# Patient Record
Sex: MALE | Race: WHITE | Employment: FULL TIME | URBAN - METROPOLITAN AREA
[De-identification: names, ages, dates, MRNs, and addresses within clinical notes are randomized per-mention and may not be internally consistent; named-entity substitution may affect disease eponyms.]

---

## 2017-02-02 ENCOUNTER — OFFICE VISIT (OUTPATIENT)
Dept: FAMILY MEDICINE CLINIC | Age: 37
End: 2017-02-02

## 2017-02-02 VITALS
WEIGHT: 169 LBS | DIASTOLIC BLOOD PRESSURE: 78 MMHG | BODY MASS INDEX: 29.95 KG/M2 | SYSTOLIC BLOOD PRESSURE: 118 MMHG | TEMPERATURE: 98.5 F | HEIGHT: 63 IN

## 2017-02-02 DIAGNOSIS — F41.9 ANXIETY: ICD-10-CM

## 2017-02-02 DIAGNOSIS — F32.4 MAJOR DEPRESSION SINGLE EPISODE, IN PARTIAL REMISSION (HCC): Primary | ICD-10-CM

## 2017-02-02 PROCEDURE — 99213 OFFICE O/P EST LOW 20 MIN: CPT | Performed by: FAMILY MEDICINE

## 2017-02-02 RX ORDER — PROMETHAZINE HYDROCHLORIDE 25 MG/1
25 TABLET ORAL EVERY 6 HOURS PRN
Qty: 20 TABLET | Refills: 0 | Status: SHIPPED | OUTPATIENT
Start: 2017-02-02 | End: 2017-02-07

## 2017-02-02 RX ORDER — DIPHENOXYLATE HYDROCHLORIDE AND ATROPINE SULFATE 2.5; .025 MG/1; MG/1
1 TABLET ORAL 4 TIMES DAILY PRN
Qty: 20 TABLET | Refills: 0 | Status: SHIPPED | OUTPATIENT
Start: 2017-02-02 | End: 2017-02-07

## 2017-02-02 ASSESSMENT — ENCOUNTER SYMPTOMS
BLOOD IN STOOL: 0
CONSTIPATION: 0
DIARRHEA: 1
VOMITING: 1
ABDOMINAL PAIN: 1
NAUSEA: 1

## 2017-02-14 RX ORDER — ALPRAZOLAM 1 MG/1
TABLET ORAL
Qty: 90 TABLET | Refills: 0 | Status: SHIPPED | OUTPATIENT
Start: 2017-02-14 | End: 2017-03-27 | Stop reason: SDUPTHER

## 2017-03-27 RX ORDER — ALPRAZOLAM 1 MG/1
TABLET ORAL
Qty: 90 TABLET | Refills: 0 | Status: SHIPPED | OUTPATIENT
Start: 2017-03-27 | End: 2017-05-05 | Stop reason: SDUPTHER

## 2017-03-27 RX ORDER — ALPRAZOLAM 1 MG/1
TABLET ORAL
Qty: 90 TABLET | Refills: 0 | OUTPATIENT
Start: 2017-03-27

## 2017-05-05 ENCOUNTER — OFFICE VISIT (OUTPATIENT)
Dept: FAMILY MEDICINE CLINIC | Age: 37
End: 2017-05-05

## 2017-05-05 VITALS
DIASTOLIC BLOOD PRESSURE: 80 MMHG | WEIGHT: 168 LBS | BODY MASS INDEX: 29.77 KG/M2 | SYSTOLIC BLOOD PRESSURE: 110 MMHG | HEIGHT: 63 IN

## 2017-05-05 DIAGNOSIS — F41.9 ANXIETY: Primary | ICD-10-CM

## 2017-05-05 PROCEDURE — 99213 OFFICE O/P EST LOW 20 MIN: CPT | Performed by: FAMILY MEDICINE

## 2017-05-05 RX ORDER — ALPRAZOLAM 1 MG/1
TABLET ORAL
Qty: 90 TABLET | Refills: 0 | Status: SHIPPED | OUTPATIENT
Start: 2017-05-05 | End: 2017-06-13 | Stop reason: SDUPTHER

## 2017-05-05 RX ORDER — FLUOXETINE HYDROCHLORIDE 20 MG/1
CAPSULE ORAL
Qty: 30 CAPSULE | Refills: 2 | Status: SHIPPED | OUTPATIENT
Start: 2017-05-05 | End: 2017-08-10

## 2017-06-13 ENCOUNTER — TELEPHONE (OUTPATIENT)
Dept: FAMILY MEDICINE CLINIC | Age: 37
End: 2017-06-13

## 2017-06-13 RX ORDER — ALPRAZOLAM 1 MG/1
TABLET ORAL
Qty: 90 TABLET | Refills: 0 | Status: SHIPPED | OUTPATIENT
Start: 2017-06-13 | End: 2017-07-12

## 2017-08-10 ENCOUNTER — OFFICE VISIT (OUTPATIENT)
Dept: FAMILY MEDICINE CLINIC | Age: 37
End: 2017-08-10

## 2017-08-10 VITALS
WEIGHT: 162 LBS | HEART RATE: 60 BPM | SYSTOLIC BLOOD PRESSURE: 126 MMHG | DIASTOLIC BLOOD PRESSURE: 86 MMHG | BODY MASS INDEX: 28.7 KG/M2 | HEIGHT: 63 IN

## 2017-08-10 DIAGNOSIS — F41.9 ANXIETY: ICD-10-CM

## 2017-08-10 DIAGNOSIS — F32.4 MAJOR DEPRESSION SINGLE EPISODE, IN PARTIAL REMISSION (HCC): Primary | ICD-10-CM

## 2017-08-10 DIAGNOSIS — R40.0 DAYTIME SOMNOLENCE: ICD-10-CM

## 2017-08-10 PROCEDURE — 99213 OFFICE O/P EST LOW 20 MIN: CPT | Performed by: FAMILY MEDICINE

## 2017-08-10 RX ORDER — ALPRAZOLAM 1 MG/1
TABLET ORAL
Qty: 90 TABLET | Refills: 0 | Status: SHIPPED | OUTPATIENT
Start: 2017-08-10 | End: 2017-09-16 | Stop reason: SDUPTHER

## 2017-08-10 RX ORDER — FLUOXETINE HYDROCHLORIDE 40 MG/1
40 CAPSULE ORAL DAILY
Qty: 30 CAPSULE | Refills: 2 | Status: SHIPPED | OUTPATIENT
Start: 2017-08-10 | End: 2017-09-29 | Stop reason: SDUPTHER

## 2017-08-10 ASSESSMENT — PATIENT HEALTH QUESTIONNAIRE - PHQ9
2. FEELING DOWN, DEPRESSED OR HOPELESS: 0
SUM OF ALL RESPONSES TO PHQ QUESTIONS 1-9: 0
1. LITTLE INTEREST OR PLEASURE IN DOING THINGS: 0
SUM OF ALL RESPONSES TO PHQ9 QUESTIONS 1 & 2: 0

## 2017-08-22 ENCOUNTER — OFFICE VISIT (OUTPATIENT)
Dept: SLEEP MEDICINE | Age: 37
End: 2017-08-22

## 2017-08-22 VITALS
WEIGHT: 164 LBS | HEIGHT: 63 IN | DIASTOLIC BLOOD PRESSURE: 72 MMHG | RESPIRATION RATE: 16 BRPM | OXYGEN SATURATION: 98 % | SYSTOLIC BLOOD PRESSURE: 112 MMHG | BODY MASS INDEX: 29.06 KG/M2 | HEART RATE: 71 BPM

## 2017-08-22 DIAGNOSIS — G47.52 REM SLEEP BEHAVIOR DISORDER: Primary | ICD-10-CM

## 2017-08-22 DIAGNOSIS — G47.33 OSA (OBSTRUCTIVE SLEEP APNEA): ICD-10-CM

## 2017-08-22 DIAGNOSIS — F51.3 SLEEPWALKING: ICD-10-CM

## 2017-08-22 PROCEDURE — 99204 OFFICE O/P NEW MOD 45 MIN: CPT | Performed by: PSYCHIATRY & NEUROLOGY

## 2017-08-22 ASSESSMENT — SLEEP AND FATIGUE QUESTIONNAIRES
HOW LIKELY ARE YOU TO NOD OFF OR FALL ASLEEP WHILE SITTING AND READING: 3
HOW LIKELY ARE YOU TO NOD OFF OR FALL ASLEEP WHILE SITTING INACTIVE IN A PUBLIC PLACE: 3
HOW LIKELY ARE YOU TO NOD OFF OR FALL ASLEEP WHILE SITTING QUIETLY AFTER LUNCH WITHOUT ALCOHOL: 3
HOW LIKELY ARE YOU TO NOD OFF OR FALL ASLEEP WHILE WATCHING TV: 2
NECK CIRCUMFERENCE (INCHES): 16
ESS TOTAL SCORE: 17
HOW LIKELY ARE YOU TO NOD OFF OR FALL ASLEEP WHEN YOU ARE A PASSENGER IN A CAR FOR AN HOUR WITHOUT A BREAK: 3
HOW LIKELY ARE YOU TO NOD OFF OR FALL ASLEEP WHILE LYING DOWN TO REST IN THE AFTERNOON WHEN CIRCUMSTANCES PERMIT: 2
HOW LIKELY ARE YOU TO NOD OFF OR FALL ASLEEP IN A CAR, WHILE STOPPED FOR A FEW MINUTES IN TRAFFIC: 0
HOW LIKELY ARE YOU TO NOD OFF OR FALL ASLEEP WHILE SITTING AND TALKING TO SOMEONE: 1

## 2017-08-22 ASSESSMENT — ENCOUNTER SYMPTOMS
RESPIRATORY NEGATIVE: 1
ALLERGIC/IMMUNOLOGIC NEGATIVE: 1
GASTROINTESTINAL NEGATIVE: 1

## 2017-09-08 ENCOUNTER — HOSPITAL ENCOUNTER (OUTPATIENT)
Dept: SLEEP MEDICINE | Age: 37
Discharge: HOME | End: 2017-09-08
Attending: PSYCHIATRY & NEUROLOGY | Admitting: PSYCHIATRY & NEUROLOGY

## 2017-09-29 ENCOUNTER — TELEPHONE (OUTPATIENT)
Dept: FAMILY MEDICINE CLINIC | Age: 37
End: 2017-09-29

## 2017-09-29 DIAGNOSIS — F32.4 MAJOR DEPRESSION SINGLE EPISODE, IN PARTIAL REMISSION (HCC): ICD-10-CM

## 2017-09-29 RX ORDER — FLUOXETINE HYDROCHLORIDE 40 MG/1
40 CAPSULE ORAL DAILY
Qty: 90 CAPSULE | Refills: 1 | Status: SHIPPED | OUTPATIENT
Start: 2017-09-29 | End: 2018-03-26 | Stop reason: SDUPTHER

## 2017-09-29 NOTE — TELEPHONE ENCOUNTER
[facility] called requesting for a 90 day supply on Fluoxetine 40 mg capsule.   Send prescription to [facility]:  [address] [employer] [place], [address]  [phone] - F [phone]

## 2017-10-18 DIAGNOSIS — F41.9 ANXIETY: ICD-10-CM

## 2017-10-18 RX ORDER — ALPRAZOLAM 1 MG/1
TABLET ORAL
Qty: 90 TABLET | Refills: 0 | Status: SHIPPED | OUTPATIENT
Start: 2017-10-18 | End: 2017-11-20 | Stop reason: SDUPTHER

## 2017-11-20 ENCOUNTER — OFFICE VISIT (OUTPATIENT)
Dept: FAMILY MEDICINE CLINIC | Age: 37
End: 2017-11-20

## 2017-11-20 VITALS — SYSTOLIC BLOOD PRESSURE: 120 MMHG | DIASTOLIC BLOOD PRESSURE: 80 MMHG | WEIGHT: 163 LBS | BODY MASS INDEX: 28.87 KG/M2

## 2017-11-20 DIAGNOSIS — F41.9 ANXIETY: Primary | ICD-10-CM

## 2017-11-20 PROCEDURE — 99213 OFFICE O/P EST LOW 20 MIN: CPT | Performed by: FAMILY MEDICINE

## 2017-11-20 RX ORDER — ALPRAZOLAM 1 MG/1
TABLET ORAL
Qty: 90 TABLET | Refills: 0 | Status: SHIPPED | OUTPATIENT
Start: 2017-11-20 | End: 2017-12-24 | Stop reason: SDUPTHER

## 2017-12-24 DIAGNOSIS — F41.9 ANXIETY: ICD-10-CM

## 2017-12-26 RX ORDER — ALPRAZOLAM 1 MG/1
TABLET ORAL
Qty: 90 TABLET | Refills: 0 | Status: SHIPPED | OUTPATIENT
Start: 2017-12-26 | End: 2018-02-01 | Stop reason: SDUPTHER

## 2018-02-01 ENCOUNTER — OFFICE VISIT (OUTPATIENT)
Dept: FAMILY MEDICINE CLINIC | Age: 38
End: 2018-02-01

## 2018-02-01 VITALS
DIASTOLIC BLOOD PRESSURE: 82 MMHG | HEIGHT: 63 IN | BODY MASS INDEX: 29.95 KG/M2 | WEIGHT: 169 LBS | SYSTOLIC BLOOD PRESSURE: 124 MMHG

## 2018-02-01 DIAGNOSIS — F32.4 MAJOR DEPRESSION SINGLE EPISODE, IN PARTIAL REMISSION (HCC): Primary | ICD-10-CM

## 2018-02-01 DIAGNOSIS — J01.00 ACUTE NON-RECURRENT MAXILLARY SINUSITIS: ICD-10-CM

## 2018-02-01 DIAGNOSIS — F41.9 ANXIETY: ICD-10-CM

## 2018-02-01 PROCEDURE — 99213 OFFICE O/P EST LOW 20 MIN: CPT | Performed by: FAMILY MEDICINE

## 2018-02-01 RX ORDER — AZITHROMYCIN 250 MG/1
TABLET, FILM COATED ORAL
Qty: 1 PACKET | Refills: 0 | Status: SHIPPED | OUTPATIENT
Start: 2018-02-01 | End: 2018-02-11

## 2018-02-01 RX ORDER — ALPRAZOLAM 1 MG/1
TABLET ORAL
Qty: 90 TABLET | Refills: 0 | Status: SHIPPED | OUTPATIENT
Start: 2018-02-01 | End: 2018-03-09 | Stop reason: SDUPTHER

## 2018-02-01 ASSESSMENT — ENCOUNTER SYMPTOMS
SINUS PAIN: 1
RHINORRHEA: 1
COUGH: 1
WHEEZING: 1
SHORTNESS OF BREATH: 1
SORE THROAT: 0
SINUS PRESSURE: 1

## 2018-03-09 DIAGNOSIS — F41.9 ANXIETY: ICD-10-CM

## 2018-03-10 RX ORDER — ALPRAZOLAM 1 MG/1
TABLET ORAL
Qty: 90 TABLET | Refills: 0 | Status: SHIPPED | OUTPATIENT
Start: 2018-03-10 | End: 2018-04-18 | Stop reason: SDUPTHER

## 2018-03-13 ENCOUNTER — HOSPITAL ENCOUNTER (OUTPATIENT)
Dept: PHYSICAL THERAPY | Age: 38
Discharge: HOME | End: 2018-03-31

## 2018-04-01 ENCOUNTER — HOSPITAL ENCOUNTER (OUTPATIENT)
Dept: PHYSICAL THERAPY | Age: 38
Discharge: HOME | End: 2018-04-30

## 2018-04-18 DIAGNOSIS — F41.9 ANXIETY: ICD-10-CM

## 2018-04-18 RX ORDER — ALPRAZOLAM 1 MG/1
TABLET ORAL
Qty: 90 TABLET | Refills: 0 | Status: SHIPPED | OUTPATIENT
Start: 2018-04-18 | End: 2018-05-17 | Stop reason: SDUPTHER

## 2018-05-17 ENCOUNTER — OFFICE VISIT (OUTPATIENT)
Dept: FAMILY MEDICINE CLINIC | Age: 38
End: 2018-05-17

## 2018-05-17 VITALS
WEIGHT: 167 LBS | HEIGHT: 63 IN | DIASTOLIC BLOOD PRESSURE: 80 MMHG | BODY MASS INDEX: 29.59 KG/M2 | SYSTOLIC BLOOD PRESSURE: 120 MMHG

## 2018-05-17 DIAGNOSIS — F41.9 ANXIETY: Primary | ICD-10-CM

## 2018-05-17 PROCEDURE — 99213 OFFICE O/P EST LOW 20 MIN: CPT | Performed by: FAMILY MEDICINE

## 2018-05-17 RX ORDER — ALPRAZOLAM 1 MG/1
TABLET ORAL
Qty: 90 TABLET | Refills: 0 | Status: SHIPPED | OUTPATIENT
Start: 2018-05-17 | End: 2018-06-29 | Stop reason: SDUPTHER

## 2018-06-29 DIAGNOSIS — F41.9 ANXIETY: ICD-10-CM

## 2018-06-29 RX ORDER — ALPRAZOLAM 1 MG/1
TABLET ORAL
Qty: 90 TABLET | Refills: 0 | Status: SHIPPED | OUTPATIENT
Start: 2018-06-29 | End: 2018-08-06 | Stop reason: SDUPTHER

## 2018-08-06 DIAGNOSIS — F41.9 ANXIETY: ICD-10-CM

## 2018-08-06 RX ORDER — ALPRAZOLAM 1 MG/1
TABLET ORAL
Qty: 90 TABLET | Refills: 0 | Status: SHIPPED | OUTPATIENT
Start: 2018-08-06 | End: 2018-10-02 | Stop reason: SDUPTHER

## 2018-10-02 ENCOUNTER — OFFICE VISIT (OUTPATIENT)
Dept: FAMILY MEDICINE CLINIC | Age: 38
End: 2018-10-02
Payer: COMMERCIAL

## 2018-10-02 VITALS
HEIGHT: 63 IN | BODY MASS INDEX: 30.3 KG/M2 | WEIGHT: 171 LBS | DIASTOLIC BLOOD PRESSURE: 82 MMHG | SYSTOLIC BLOOD PRESSURE: 132 MMHG

## 2018-10-02 DIAGNOSIS — F41.9 ANXIETY: ICD-10-CM

## 2018-10-02 DIAGNOSIS — F32.4 MAJOR DEPRESSION SINGLE EPISODE, IN PARTIAL REMISSION (HCC): Primary | ICD-10-CM

## 2018-10-02 PROCEDURE — 99213 OFFICE O/P EST LOW 20 MIN: CPT | Performed by: FAMILY MEDICINE

## 2018-10-02 RX ORDER — ALPRAZOLAM 1 MG/1
TABLET ORAL
Qty: 90 TABLET | Refills: 0 | Status: SHIPPED | OUTPATIENT
Start: 2018-10-02 | End: 2018-11-20 | Stop reason: SDUPTHER

## 2018-11-20 DIAGNOSIS — F41.9 ANXIETY: ICD-10-CM

## 2018-11-20 RX ORDER — ALPRAZOLAM 1 MG/1
TABLET ORAL
Qty: 90 TABLET | Refills: 0 | Status: SHIPPED | OUTPATIENT
Start: 2018-11-20 | End: 2019-01-14 | Stop reason: SDUPTHER

## 2018-12-16 DIAGNOSIS — F32.4 MAJOR DEPRESSION SINGLE EPISODE, IN PARTIAL REMISSION (HCC): ICD-10-CM

## 2018-12-16 RX ORDER — FLUOXETINE HYDROCHLORIDE 40 MG/1
40 CAPSULE ORAL DAILY
Qty: 90 CAPSULE | Refills: 0 | Status: SHIPPED | OUTPATIENT
Start: 2018-12-16 | End: 2019-04-23 | Stop reason: SDUPTHER

## 2019-01-14 ENCOUNTER — OFFICE VISIT (OUTPATIENT)
Dept: FAMILY MEDICINE CLINIC | Age: 39
End: 2019-01-14
Payer: COMMERCIAL

## 2019-01-14 VITALS
DIASTOLIC BLOOD PRESSURE: 78 MMHG | HEIGHT: 63 IN | WEIGHT: 177 LBS | BODY MASS INDEX: 31.36 KG/M2 | SYSTOLIC BLOOD PRESSURE: 122 MMHG

## 2019-01-14 DIAGNOSIS — F41.9 ANXIETY: ICD-10-CM

## 2019-01-14 PROCEDURE — 99213 OFFICE O/P EST LOW 20 MIN: CPT | Performed by: FAMILY MEDICINE

## 2019-01-14 RX ORDER — ALPRAZOLAM 1 MG/1
TABLET ORAL
Qty: 90 TABLET | Refills: 0 | Status: SHIPPED | OUTPATIENT
Start: 2019-01-14 | End: 2019-02-28 | Stop reason: SDUPTHER

## 2019-02-28 DIAGNOSIS — F41.9 ANXIETY: ICD-10-CM

## 2019-02-28 RX ORDER — ALPRAZOLAM 1 MG/1
TABLET ORAL
Qty: 90 TABLET | Refills: 0 | Status: SHIPPED | OUTPATIENT
Start: 2019-02-28 | End: 2019-04-23 | Stop reason: SDUPTHER

## 2019-04-23 ENCOUNTER — OFFICE VISIT (OUTPATIENT)
Dept: FAMILY MEDICINE CLINIC | Age: 39
End: 2019-04-23
Payer: COMMERCIAL

## 2019-04-23 VITALS
DIASTOLIC BLOOD PRESSURE: 72 MMHG | BODY MASS INDEX: 30.83 KG/M2 | WEIGHT: 174 LBS | HEIGHT: 63 IN | SYSTOLIC BLOOD PRESSURE: 132 MMHG

## 2019-04-23 DIAGNOSIS — F41.9 ANXIETY: ICD-10-CM

## 2019-04-23 DIAGNOSIS — F32.4 MAJOR DEPRESSION SINGLE EPISODE, IN PARTIAL REMISSION (HCC): Primary | ICD-10-CM

## 2019-04-23 PROCEDURE — 99213 OFFICE O/P EST LOW 20 MIN: CPT | Performed by: FAMILY MEDICINE

## 2019-04-23 RX ORDER — FLUOXETINE HYDROCHLORIDE 40 MG/1
40 CAPSULE ORAL DAILY
Qty: 90 CAPSULE | Refills: 1 | Status: SHIPPED | OUTPATIENT
Start: 2019-04-23 | End: 2019-07-23

## 2019-04-23 RX ORDER — ALPRAZOLAM 1 MG/1
TABLET ORAL
Qty: 90 TABLET | Refills: 0 | Status: SHIPPED | OUTPATIENT
Start: 2019-04-23 | End: 2019-06-05 | Stop reason: SDUPTHER

## 2019-06-05 DIAGNOSIS — F41.9 ANXIETY: ICD-10-CM

## 2019-06-05 RX ORDER — ALPRAZOLAM 1 MG/1
TABLET ORAL
Qty: 90 TABLET | Refills: 0 | Status: SHIPPED | OUTPATIENT
Start: 2019-06-05 | End: 2019-07-05

## 2019-07-23 ENCOUNTER — OFFICE VISIT (OUTPATIENT)
Dept: FAMILY MEDICINE CLINIC | Age: 39
End: 2019-07-23
Payer: COMMERCIAL

## 2019-07-23 VITALS
SYSTOLIC BLOOD PRESSURE: 118 MMHG | HEART RATE: 92 BPM | BODY MASS INDEX: 29.41 KG/M2 | DIASTOLIC BLOOD PRESSURE: 76 MMHG | OXYGEN SATURATION: 98 % | WEIGHT: 166 LBS

## 2019-07-23 DIAGNOSIS — F41.9 ANXIETY: ICD-10-CM

## 2019-07-23 DIAGNOSIS — F32.4 MAJOR DEPRESSION SINGLE EPISODE, IN PARTIAL REMISSION (HCC): Primary | ICD-10-CM

## 2019-07-23 PROCEDURE — 99213 OFFICE O/P EST LOW 20 MIN: CPT | Performed by: FAMILY MEDICINE

## 2019-07-23 RX ORDER — ALPRAZOLAM 1 MG/1
1 TABLET ORAL 3 TIMES DAILY PRN
COMMUNITY
End: 2019-07-23 | Stop reason: SDUPTHER

## 2019-07-23 RX ORDER — FLUOXETINE HYDROCHLORIDE 20 MG/1
60 CAPSULE ORAL DAILY
Qty: 90 CAPSULE | Refills: 5 | Status: SHIPPED | OUTPATIENT
Start: 2019-07-23 | End: 2020-01-31

## 2019-07-23 RX ORDER — FLUOXETINE HYDROCHLORIDE 40 MG/1
40 CAPSULE ORAL DAILY
Qty: 90 CAPSULE | Refills: 1 | Status: CANCELLED | OUTPATIENT
Start: 2019-07-23

## 2019-07-23 RX ORDER — ALPRAZOLAM 1 MG/1
1 TABLET ORAL 3 TIMES DAILY PRN
Qty: 90 TABLET | Refills: 0 | Status: SHIPPED | OUTPATIENT
Start: 2019-07-23 | End: 2019-09-04 | Stop reason: SDUPTHER

## 2019-09-04 DIAGNOSIS — F41.9 ANXIETY: ICD-10-CM

## 2019-09-04 RX ORDER — ALPRAZOLAM 1 MG/1
1 TABLET ORAL 3 TIMES DAILY PRN
Qty: 63 TABLET | Refills: 1 | Status: SHIPPED | OUTPATIENT
Start: 2019-09-04 | End: 2019-10-15 | Stop reason: SDUPTHER

## 2019-10-15 DIAGNOSIS — F41.9 ANXIETY: ICD-10-CM

## 2019-10-15 RX ORDER — ALPRAZOLAM 1 MG/1
1 TABLET ORAL 3 TIMES DAILY PRN
Qty: 63 TABLET | Refills: 0 | Status: SHIPPED | OUTPATIENT
Start: 2019-10-15 | End: 2019-11-03

## 2019-10-17 ENCOUNTER — OFFICE VISIT (OUTPATIENT)
Dept: FAMILY MEDICINE CLINIC | Age: 39
End: 2019-10-17
Payer: COMMERCIAL

## 2019-10-17 VITALS
HEIGHT: 62 IN | HEART RATE: 98 BPM | SYSTOLIC BLOOD PRESSURE: 114 MMHG | DIASTOLIC BLOOD PRESSURE: 82 MMHG | WEIGHT: 168 LBS | BODY MASS INDEX: 30.91 KG/M2

## 2019-10-17 DIAGNOSIS — F41.9 ANXIETY: ICD-10-CM

## 2019-10-17 DIAGNOSIS — F32.4 MAJOR DEPRESSION SINGLE EPISODE, IN PARTIAL REMISSION (HCC): Primary | ICD-10-CM

## 2019-10-17 DIAGNOSIS — Z23 NEED FOR TDAP VACCINATION: ICD-10-CM

## 2019-10-17 DIAGNOSIS — J20.9 ACUTE BRONCHITIS, UNSPECIFIED ORGANISM: ICD-10-CM

## 2019-10-17 PROCEDURE — 90471 IMMUNIZATION ADMIN: CPT | Performed by: FAMILY MEDICINE

## 2019-10-17 PROCEDURE — 90715 TDAP VACCINE 7 YRS/> IM: CPT | Performed by: FAMILY MEDICINE

## 2019-10-17 PROCEDURE — 99213 OFFICE O/P EST LOW 20 MIN: CPT | Performed by: FAMILY MEDICINE

## 2019-10-17 RX ORDER — AMOXICILLIN 875 MG/1
875 TABLET, COATED ORAL 2 TIMES DAILY
Qty: 20 TABLET | Refills: 0 | Status: SHIPPED | OUTPATIENT
Start: 2019-10-17 | End: 2019-10-22

## 2019-10-17 ASSESSMENT — ENCOUNTER SYMPTOMS
SINUS PRESSURE: 0
SHORTNESS OF BREATH: 1
RHINORRHEA: 0
SINUS PAIN: 0
WHEEZING: 1
COUGH: 1
SORE THROAT: 1

## 2019-10-22 ENCOUNTER — TELEPHONE (OUTPATIENT)
Dept: FAMILY MEDICINE CLINIC | Age: 39
End: 2019-10-22

## 2019-10-22 RX ORDER — SULFAMETHOXAZOLE AND TRIMETHOPRIM 800; 160 MG/1; MG/1
1 TABLET ORAL 2 TIMES DAILY
Qty: 20 TABLET | Refills: 0 | Status: SHIPPED | OUTPATIENT
Start: 2019-10-22 | End: 2019-11-01

## 2019-11-02 DIAGNOSIS — F41.9 ANXIETY: ICD-10-CM

## 2019-11-03 RX ORDER — ALPRAZOLAM 1 MG/1
1 TABLET ORAL 3 TIMES DAILY PRN
Qty: 63 TABLET | Refills: 1 | Status: SHIPPED | OUTPATIENT
Start: 2019-11-03 | End: 2019-12-21

## 2019-12-21 DIAGNOSIS — F41.9 ANXIETY: ICD-10-CM

## 2019-12-21 RX ORDER — ALPRAZOLAM 1 MG/1
1 TABLET ORAL 3 TIMES DAILY PRN
Qty: 63 TABLET | Refills: 1 | Status: SHIPPED | OUTPATIENT
Start: 2019-12-21 | End: 2020-02-12 | Stop reason: SDUPTHER

## 2020-01-31 RX ORDER — FLUOXETINE HYDROCHLORIDE 20 MG/1
CAPSULE ORAL
Qty: 63 CAPSULE | Refills: 8 | Status: SHIPPED | OUTPATIENT
Start: 2020-01-31 | End: 2020-05-12 | Stop reason: SDUPTHER

## 2020-02-10 NOTE — PROGRESS NOTES
Subjective:      Patient ID: Reta Seymour is a 46 y.o. male. HPI     Depression: Patient takes Prozac 60 mg once daily. Dio Weathers feels that the medication is helping with his symptoms.   He is sleeping only fair due to anxiety but denies any suicidal ideation. Dio Weathers feels that he still needs to be on the medication.       Anxiety:  Patient takes Xanax 1 mg TID.  Dio Weathers feels that it works well to control his anxiety.   He does not always take it TID. Review of Systems   Constitutional: Negative for chills and fever. Psychiatric/Behavioral: Positive for sleep disturbance. Negative for agitation, decreased concentration, dysphoric mood and suicidal ideas. The patient is nervous/anxious. /80 (Site: Right Upper Arm)   Ht 5' 2\" (1.575 m)   Wt 165 lb (74.8 kg)   BMI 30.18 kg/m²    Objective:   Physical Exam  Vitals signs reviewed. Constitutional:       General: He is not in acute distress. Appearance: He is well-developed. HENT:      Head: Normocephalic. Right Ear: External ear normal.      Left Ear: External ear normal.   Neck:      Thyroid: No thyromegaly. Vascular: No carotid bruit or JVD. Cardiovascular:      Rate and Rhythm: Normal rate and regular rhythm. Heart sounds: Normal heart sounds. No murmur. Pulmonary:      Effort: Pulmonary effort is normal.      Breath sounds: Normal breath sounds. No wheezing or rales. Lymphadenopathy:      Cervical: No cervical adenopathy. Neurological:      Mental Status: He is alert and oriented to person, place, and time. Assessment:      Depression   Anxiety       Plan:      OARRS report was reviewed  Medications refilled   RTO 3 months for Depression / Anxiety     Controlled Substance Monitoring:    Acute and Chronic Pain Monitoring:   RX Monitoring 2/12/2020   Attestation -   Periodic Controlled Substance Monitoring No signs of potential drug abuse or diversion identified.               [id number] Griffin DO

## 2020-02-12 ENCOUNTER — OFFICE VISIT (OUTPATIENT)
Dept: FAMILY MEDICINE CLINIC | Age: 40
End: 2020-02-12
Payer: COMMERCIAL

## 2020-02-12 VITALS
DIASTOLIC BLOOD PRESSURE: 80 MMHG | BODY MASS INDEX: 30.36 KG/M2 | SYSTOLIC BLOOD PRESSURE: 120 MMHG | WEIGHT: 165 LBS | HEIGHT: 62 IN

## 2020-02-12 PROCEDURE — 99213 OFFICE O/P EST LOW 20 MIN: CPT | Performed by: FAMILY MEDICINE

## 2020-02-12 RX ORDER — ALPRAZOLAM 1 MG/1
1 TABLET ORAL 3 TIMES DAILY
Qty: 90 TABLET | Refills: 0 | Status: SHIPPED | OUTPATIENT
Start: 2020-02-12 | End: 2020-03-13

## 2020-03-13 RX ORDER — ALPRAZOLAM 1 MG/1
1 TABLET ORAL 3 TIMES DAILY
Qty: 63 TABLET | Refills: 1 | Status: SHIPPED | OUTPATIENT
Start: 2020-03-13 | End: 2020-04-16 | Stop reason: SDUPTHER

## 2020-04-16 ENCOUNTER — TELEPHONE (OUTPATIENT)
Dept: FAMILY MEDICINE CLINIC | Age: 40
End: 2020-04-16

## 2020-04-16 RX ORDER — ALPRAZOLAM 1 MG/1
1 TABLET ORAL 3 TIMES DAILY
Qty: 63 TABLET | Refills: 1 | Status: SHIPPED | OUTPATIENT
Start: 2020-04-16 | End: 2020-06-06

## 2020-05-11 NOTE — PROGRESS NOTES
that the medication is helping with his symptoms.   He is sleeping  fair due to back pain but denies any suicidal ideation. [facility] feels that he still needs to be on the medication.       Anxiety:  Patient takes Xanax 1 mg TID.  [facility] feels that it works well to control his anxiety.   He does not always take it TID. Review of Systems   Constitutional: Negative for chills and fever. Psychiatric/Behavioral: Positive for dysphoric mood. Negative for suicidal ideas. The patient is nervous/anxious. There were no vitals taken for this visit. Objective:   Physical Exam  Constitutional:       General: He is not in acute distress. Appearance: Normal appearance. He is normal weight. He is not ill-appearing. HENT:      Head: Normocephalic. Pulmonary:      Effort: Pulmonary effort is normal.   Neurological:      Mental Status: He is alert and oriented to person, place, and time. Psychiatric:         Mood and Affect: Mood normal.         Behavior: Behavior normal.         Thought Content: Thought content normal.         Judgment: Judgment normal.         Assessment:      Depression  Anxiety       Plan:       OARRS report was reviewed  Medications refilled   RTO 3 months for Depression / Anxiety     Controlled Substance Monitoring:    Acute and Chronic Pain Monitoring:   RX Monitoring 5/12/2020   Attestation -   Periodic Controlled Substance Monitoring No signs of potential drug abuse or diversion identified.                    Gris Rae,

## 2020-05-12 ENCOUNTER — TELEMEDICINE (OUTPATIENT)
Dept: FAMILY MEDICINE CLINIC | Age: 40
End: 2020-05-12
Payer: COMMERCIAL

## 2020-05-12 PROCEDURE — 99213 OFFICE O/P EST LOW 20 MIN: CPT | Performed by: FAMILY MEDICINE

## 2020-05-12 RX ORDER — FLUOXETINE HYDROCHLORIDE 20 MG/1
CAPSULE ORAL
Qty: 90 CAPSULE | Refills: 5 | Status: SHIPPED | OUTPATIENT
Start: 2020-05-12 | End: 2020-08-10 | Stop reason: SDUPTHER

## 2020-06-06 RX ORDER — ALPRAZOLAM 1 MG/1
1 TABLET ORAL 3 TIMES DAILY
Qty: 63 TABLET | Refills: 1 | Status: SHIPPED | OUTPATIENT
Start: 2020-06-06 | End: 2020-07-15

## 2020-08-09 NOTE — PROGRESS NOTES
Subjective:      Patient ID: Loki Hassan is a 46 y.o. male. HPI TELEHEALTH EVALUATION -- Audio/Visual (During AZYTP-15 public health emergency)     Pursuant to the emergency declaration under the [address] [place] [address] and the [employer] Act, this Virtual  Visit was conducted, with patient's consent, to reduce the patient's risk of exposure to COVID-19 and provide continuity of care for an established patient. Services were provided through a video synchronous discussion virtually to substitute for in-person clinic visit. Loki Hassan is a 46 y.o. male being evaluated by a Virtual Visit (video visit) encounter to address concerns as mentioned above. A caregiver was present when appropriate. Due to this being a TeleHealth encounter (During [facility]-19 public health emergency), evaluation of the following organ systems was limited: Vitals/Constitutional/EENT/Resp/CV/GI//MS/Neuro/Skin/Heme-Lymph-Imm. Pursuant to the emergency declaration under the [address] [place] [address] and the [employer] Act, this Virtual Visit was conducted with patient's (and/or legal guardian's) consent, to reduce the patient's risk of exposure to COVID-19 and provide necessary medical care. The patient (and/or legal guardian) has also been advised to contact this office for worsening conditions or problems, and seek emergency medical treatment and/or call 911 if deemed necessary. Services were provided through a video synchronous discussion virtually to substitute for in-person clinic visit. Patient and provider were located at their individual homes. --Reji Moncada DO on 8/9/2020 at 5:10 PM    An electronic signature was used to authenticate this note.       Depression: Patient takes Prozac 60 mg once daily. [facility] feels

## 2020-08-10 ENCOUNTER — TELEMEDICINE (OUTPATIENT)
Dept: FAMILY MEDICINE CLINIC | Age: 40
End: 2020-08-10
Payer: COMMERCIAL

## 2020-08-10 PROCEDURE — 99213 OFFICE O/P EST LOW 20 MIN: CPT | Performed by: FAMILY MEDICINE

## 2020-08-10 RX ORDER — FLUOXETINE HYDROCHLORIDE 20 MG/1
CAPSULE ORAL
Qty: 90 CAPSULE | Refills: 5 | Status: SHIPPED | OUTPATIENT
Start: 2020-08-10 | End: 2020-11-16

## 2020-08-10 RX ORDER — ALPRAZOLAM 1 MG/1
1 TABLET ORAL 3 TIMES DAILY
Qty: 63 TABLET | Refills: 0 | Status: SHIPPED | OUTPATIENT
Start: 2020-08-10 | End: 2020-09-09

## 2020-09-09 RX ORDER — ALPRAZOLAM 1 MG/1
1 TABLET ORAL 3 TIMES DAILY
Qty: 63 TABLET | Refills: 0 | Status: SHIPPED | OUTPATIENT
Start: 2020-09-09 | End: 2020-09-29

## 2020-09-29 RX ORDER — ALPRAZOLAM 1 MG/1
1 TABLET ORAL 3 TIMES DAILY
Qty: 63 TABLET | Refills: 0 | Status: SHIPPED | OUTPATIENT
Start: 2020-09-29 | End: 2020-10-22

## 2020-10-22 RX ORDER — ALPRAZOLAM 1 MG/1
1 TABLET ORAL 3 TIMES DAILY
Qty: 63 TABLET | Refills: 0 | Status: SHIPPED | OUTPATIENT
Start: 2020-10-22 | End: 2020-11-16 | Stop reason: SDUPTHER

## 2020-11-15 NOTE — PROGRESS NOTES
Subjective:      Patient ID: Maura Kanner is a 45 y.o. male. HPI     Depression: Patient takes Prozac 60 mg once daily. [facility] feels that the medication is helping with his symptoms.   He is sleeping  fair due to back pain but denies any suicidal ideation. [facility] feels that he still needs to be on the medication.  He complains of profuse sweating from the medication. He would like to change to a different medication.      Anxiety:  Patient takes Xanax 1 mg TID. [facility] feels that it works well to control his anxiety.   He does not always take it TID.         Review of Systems   Constitutional: Negative for chills and fever. Psychiatric/Behavioral: Positive for dysphoric mood. Negative for suicidal ideas. The patient is nervous/anxious. There were no vitals taken for this visit. Objective:   Physical Exam  Vitals signs reviewed. Constitutional:       General: He is not in acute distress. Appearance: He is well-developed. HENT:      Head: Normocephalic. Right Ear: External ear normal.      Left Ear: External ear normal.   Neck:      Thyroid: No thyromegaly. Vascular: No carotid bruit or JVD. Cardiovascular:      Rate and Rhythm: Normal rate and regular rhythm. Heart sounds: Normal heart sounds. No murmur. Pulmonary:      Effort: Pulmonary effort is normal.      Breath sounds: Normal breath sounds. No wheezing or rales. Lymphadenopathy:      Cervical: No cervical adenopathy. Neurological:      Mental Status: He is alert and oriented to person, place, and time. Assessment:      Depression  Anxiety       Plan:       Stop the Prozac   Rx Remeron 15 mg nightly   OARRS report was reviewed  Medications refilled   RTO 3 months for Depression / Anxiety     Controlled Substance Monitoring:    Acute and Chronic Pain Monitoring:   RX Monitoring 11/16/2020   Attestation -   Periodic Controlled Substance Monitoring No signs of potential drug abuse or diversion identified.

## 2020-11-16 ENCOUNTER — TELEMEDICINE (OUTPATIENT)
Dept: FAMILY MEDICINE CLINIC | Age: 40
End: 2020-11-16
Payer: COMMERCIAL

## 2020-11-16 PROCEDURE — 99213 OFFICE O/P EST LOW 20 MIN: CPT | Performed by: FAMILY MEDICINE

## 2020-11-16 RX ORDER — ALPRAZOLAM 1 MG/1
1 TABLET ORAL 3 TIMES DAILY
Qty: 90 TABLET | Refills: 0 | Status: SHIPPED | OUTPATIENT
Start: 2020-11-16 | End: 2020-12-28

## 2020-11-16 RX ORDER — MIRTAZAPINE 15 MG/1
15 TABLET, FILM COATED ORAL NIGHTLY
Qty: 30 TABLET | Refills: 2 | Status: SHIPPED | OUTPATIENT
Start: 2020-11-16 | End: 2021-01-23

## 2020-12-16 ENCOUNTER — OFFICE VISIT (OUTPATIENT)
Dept: PRIMARY CARE CLINIC | Age: 40
End: 2020-12-16
Payer: COMMERCIAL

## 2020-12-16 PROCEDURE — 99211 OFF/OP EST MAY X REQ PHY/QHP: CPT | Performed by: NURSE PRACTITIONER

## 2020-12-16 NOTE — PROGRESS NOTES
Thai Salas received a viral test for COVID-19. They were educated on isolation and quarantine as appropriate. For any symptoms, they were directed to seek care from their PCP, given contact information to establish with a doctor, directed to an urgent care or the emergency room.

## 2020-12-17 LAB — SARS-COV-2: NOT DETECTED

## 2020-12-28 ENCOUNTER — TELEPHONE (OUTPATIENT)
Dept: FAMILY MEDICINE CLINIC | Age: 40
End: 2020-12-28

## 2020-12-28 RX ORDER — ALBUTEROL SULFATE 90 UG/1
2 AEROSOL, METERED RESPIRATORY (INHALATION) EVERY 4 HOURS PRN
Qty: 1 INHALER | Refills: 0 | Status: SHIPPED | OUTPATIENT
Start: 2020-12-28 | End: 2021-01-19

## 2021-01-19 RX ORDER — ALBUTEROL SULFATE 90 UG/1
AEROSOL, METERED RESPIRATORY (INHALATION)
Qty: 8.5 INHALER | Refills: 0 | Status: SHIPPED | OUTPATIENT
Start: 2021-01-19 | End: 2021-11-01

## 2021-01-23 DIAGNOSIS — F41.9 ANXIETY: ICD-10-CM

## 2021-01-24 RX ORDER — ALPRAZOLAM 1 MG/1
1 TABLET ORAL 3 TIMES DAILY PRN
Qty: 90 TABLET | Refills: 0 | Status: SHIPPED | OUTPATIENT
Start: 2021-01-24 | End: 2021-03-01 | Stop reason: SDUPTHER

## 2021-02-28 ASSESSMENT — ENCOUNTER SYMPTOMS: SHORTNESS OF BREATH: 1

## 2021-02-28 NOTE — PROGRESS NOTES
Subjective:      Patient ID: Kelsi White is a 45 y.o. male. HPI     Depression: Patient had profuse sweating with Prozac. He is tolerating and compliant with Remeron 15 mg nightly. He feels that the medication is helping with his symptoms. He is sleeping well and denies any suicidal ideation. He feels that he still needs to be on the medication.        Anxiety:  Patient takes Xanax 1 mg TID. [facility] feels that it works well to control his anxiety.   He does not always take it TID.       Dyspnea:  Patient has gained weight during the pandemic. He states that when he wears a mask and walks around, he will become short of breath. He has been using an Albuterol HFA inhaler at times. He admits to waking at times gasping for air and was previously told that he stops breathing while he is asleep. He feels rested in AM and denies daytime somnolence. Review of Systems   Constitutional: Negative for chills and fever. Respiratory: Positive for shortness of breath. There were no vitals taken for this visit. Objective:   Physical Exam  Vitals signs reviewed. Constitutional:       General: He is not in acute distress. Appearance: He is well-developed. HENT:      Head: Normocephalic. Right Ear: External ear normal.      Left Ear: External ear normal.   Neck:      Thyroid: No thyromegaly. Vascular: No carotid bruit or JVD. Cardiovascular:      Rate and Rhythm: Normal rate and regular rhythm. Heart sounds: Normal heart sounds. No murmur. Pulmonary:      Effort: Pulmonary effort is normal.      Breath sounds: Normal breath sounds. No wheezing or rales. Lymphadenopathy:      Cervical: No cervical adenopathy. Neurological:      Mental Status: He is alert and oriented to person, place, and time.          Assessment:      Depression   Anxiety   Dyspnea - Due to weight gain  / wearing mask       Plan:      OARRS report was reviewed  Medications refilled   RTO 3 months for Depression / Anxiety     Controlled Substance Monitoring:    Acute and Chronic Pain Monitoring:   RX Monitoring 3/1/2021   Attestation -   Periodic Controlled Substance Monitoring No signs of potential drug abuse or diversion identified.               Ya Eric, DO

## 2021-03-01 ENCOUNTER — TELEMEDICINE (OUTPATIENT)
Dept: FAMILY MEDICINE CLINIC | Age: 41
End: 2021-03-01

## 2021-03-01 DIAGNOSIS — F41.9 ANXIETY: ICD-10-CM

## 2021-03-01 DIAGNOSIS — F32.4 MAJOR DEPRESSION SINGLE EPISODE, IN PARTIAL REMISSION (HCC): Primary | ICD-10-CM

## 2021-03-01 DIAGNOSIS — R06.02 SHORTNESS OF BREATH: ICD-10-CM

## 2021-03-01 PROCEDURE — 99213 OFFICE O/P EST LOW 20 MIN: CPT | Performed by: FAMILY MEDICINE

## 2021-03-01 RX ORDER — ALPRAZOLAM 1 MG/1
1 TABLET ORAL 3 TIMES DAILY PRN
Qty: 90 TABLET | Refills: 0 | Status: SHIPPED | OUTPATIENT
Start: 2021-03-01 | End: 2021-04-07

## 2021-03-01 RX ORDER — MIRTAZAPINE 15 MG/1
TABLET, FILM COATED ORAL
Qty: 30 TABLET | Refills: 5 | Status: SHIPPED | OUTPATIENT
Start: 2021-03-01 | End: 2021-06-28 | Stop reason: SDUPTHER

## 2021-04-07 DIAGNOSIS — F41.9 ANXIETY: ICD-10-CM

## 2021-04-07 RX ORDER — ALPRAZOLAM 1 MG/1
1 TABLET ORAL 3 TIMES DAILY PRN
Qty: 90 TABLET | Refills: 0 | Status: SHIPPED | OUTPATIENT
Start: 2021-04-07 | End: 2021-05-09

## 2021-05-08 DIAGNOSIS — F41.9 ANXIETY: ICD-10-CM

## 2021-05-09 RX ORDER — ALPRAZOLAM 1 MG/1
1 TABLET ORAL 3 TIMES DAILY PRN
Qty: 90 TABLET | Refills: 0 | Status: SHIPPED | OUTPATIENT
Start: 2021-05-09 | End: 2021-06-28 | Stop reason: SDUPTHER

## 2021-05-25 ENCOUNTER — TELEPHONE (OUTPATIENT)
Dept: FAMILY MEDICINE CLINIC | Age: 41
End: 2021-05-25

## 2021-06-27 NOTE — PROGRESS NOTES
Subjective:      Patient ID: Hanane Toledo is a 45 y.o. male. HPI TELEHEALTH EVALUATION -- Audio/Visual (During GFZDX-33 public health emergency)     Pursuant to the emergency declaration under the [address] [place] [address] and the [employer] Act, this Virtual  Visit was conducted, with patient's consent, to reduce the patient's risk of exposure to COVID-19 and provide continuity of care for an established patient. Services were provided through a video synchronous discussion virtually to substitute for in-person clinic visit. Hanane Toledo is a 45 y.o. male being evaluated by a Virtual Visit (video visit) encounter to address concerns as mentioned above. A caregiver was present when appropriate. Due to this being a TeleHealth encounter (During ATNHONY-78 public health emergency), evaluation of the following organ systems was limited: Vitals/Constitutional/EENT/Resp/CV/GI//MS/Neuro/Skin/Heme-Lymph-Imm. Pursuant to the emergency declaration under the [address] [place] [address], [address] [place] [address] and the [employer] Act, this Virtual Visit was conducted with patient's (and/or legal guardian's) consent, to reduce the patient's risk of exposure to COVID-19 and provide necessary medical care. The patient (and/or legal guardian) has also been advised to contact this office for worsening conditions or problems, and seek emergency medical treatment and/or call 911 if deemed necessary. Services were provided through a video synchronous discussion virtually to substitute for in-person clinic visit. Patient and provider were located at their individual homes. --Amparo Meeks DO on 6/27/2021 at 3:45 PM    An electronic signature was used to authenticate this note. Depression: Patient had profuse sweating with Prozac.   He is tolerating and compliant with Remeron 15 mg nightly. He feels that the medication is helping with his symptoms. He is sleeping well and denies any suicidal ideation. He feels that he still needs to be on the medication. He continues to have some trouble sleeping and has occasional panic attacks.       Anxiety:  Patient takes Xanax 1 mg TID. [facility] feels that it works well to control his anxiety.   He does not always take it TID.        Review of Systems   Constitutional: Negative for chills and fever. Psychiatric/Behavioral: Negative for agitation, decreased concentration, dysphoric mood, sleep disturbance and suicidal ideas. The patient is nervous/anxious. BP Readings from Last 3 Encounters:   02/12/20 120/80   10/17/19 114/82   07/23/19 118/76       There were no vitals taken for this visit. Objective:   Physical Exam  Constitutional:       General: He is not in acute distress. Appearance: Normal appearance. He is normal weight. He is not ill-appearing or toxic-appearing. HENT:      Head: Normocephalic. Pulmonary:      Effort: Pulmonary effort is normal.   Neurological:      Mental Status: He is alert and oriented to person, place, and time. Psychiatric:         Mood and Affect: Mood normal.         Behavior: Behavior normal.         Thought Content: Thought content normal.         Judgment: Judgment normal.         Assessment:      Depression   Anxiety       Plan:       OARRS report was reviewed  Medications refilled   RTO 3 months for Depression / Anxiety       Controlled Substance Monitoring:    Acute and Chronic Pain Monitoring:   RX Monitoring 6/28/2021   Attestation -   Periodic Controlled Substance Monitoring No signs of potential drug abuse or diversion identified.               [address] Bree Griffin DO

## 2021-06-28 ENCOUNTER — TELEMEDICINE (OUTPATIENT)
Dept: FAMILY MEDICINE CLINIC | Age: 41
End: 2021-06-28

## 2021-06-28 DIAGNOSIS — F41.9 ANXIETY: ICD-10-CM

## 2021-06-28 DIAGNOSIS — F32.4 MAJOR DEPRESSION SINGLE EPISODE, IN PARTIAL REMISSION (HCC): Primary | ICD-10-CM

## 2021-06-28 PROCEDURE — 99213 OFFICE O/P EST LOW 20 MIN: CPT | Performed by: FAMILY MEDICINE

## 2021-06-28 RX ORDER — ALPRAZOLAM 1 MG/1
1 TABLET ORAL 3 TIMES DAILY PRN
Qty: 90 TABLET | Refills: 0 | Status: SHIPPED | OUTPATIENT
Start: 2021-06-28 | End: 2021-07-30

## 2021-06-28 RX ORDER — MIRTAZAPINE 15 MG/1
TABLET, FILM COATED ORAL
Qty: 30 TABLET | Refills: 5 | Status: SHIPPED | OUTPATIENT
Start: 2021-06-28 | End: 2021-10-20

## 2021-07-29 DIAGNOSIS — F41.9 ANXIETY: ICD-10-CM

## 2021-07-30 RX ORDER — ALPRAZOLAM 1 MG/1
1 TABLET ORAL 3 TIMES DAILY PRN
Qty: 90 TABLET | Refills: 0 | Status: SHIPPED | OUTPATIENT
Start: 2021-07-30 | End: 2021-08-29

## 2021-08-27 DIAGNOSIS — F41.9 ANXIETY: ICD-10-CM

## 2021-08-29 RX ORDER — ALPRAZOLAM 1 MG/1
1 TABLET ORAL 3 TIMES DAILY PRN
Qty: 90 TABLET | Refills: 0 | Status: SHIPPED | OUTPATIENT
Start: 2021-08-29 | End: 2021-09-27

## 2021-09-27 DIAGNOSIS — F41.9 ANXIETY: ICD-10-CM

## 2021-09-27 RX ORDER — ALPRAZOLAM 1 MG/1
1 TABLET ORAL 3 TIMES DAILY PRN
Qty: 90 TABLET | Refills: 0 | Status: SHIPPED | OUTPATIENT
Start: 2021-09-27 | End: 2021-11-02 | Stop reason: SDUPTHER

## 2021-09-30 ENCOUNTER — TELEPHONE (OUTPATIENT)
Dept: FAMILY MEDICINE CLINIC | Age: 41
End: 2021-09-30

## 2021-09-30 DIAGNOSIS — J34.2 DEVIATED SEPTUM: Primary | ICD-10-CM

## 2021-09-30 NOTE — TELEPHONE ENCOUNTER
I have placed a referral to:    Poli Oh MD  [address] [facility] [address], [place] [address]  [phone]

## 2021-09-30 NOTE — TELEPHONE ENCOUNTER
Patient requesting referral for an ENT for his deviated septum. States Dr. Kandi Rojas told him to call when he is ready to pursue. Statement Selected

## 2021-10-06 ENCOUNTER — OFFICE VISIT (OUTPATIENT)
Dept: ENT CLINIC | Age: 41
End: 2021-10-06
Payer: COMMERCIAL

## 2021-10-06 VITALS
DIASTOLIC BLOOD PRESSURE: 92 MMHG | BODY MASS INDEX: 31.09 KG/M2 | HEART RATE: 75 BPM | WEIGHT: 170 LBS | SYSTOLIC BLOOD PRESSURE: 136 MMHG

## 2021-10-06 DIAGNOSIS — J34.2 DEVIATED SEPTUM: ICD-10-CM

## 2021-10-06 DIAGNOSIS — R09.81 NASAL CONGESTION: Primary | ICD-10-CM

## 2021-10-06 DIAGNOSIS — J34.89 NASAL VALVE COLLAPSE: ICD-10-CM

## 2021-10-06 DIAGNOSIS — Z41.9 SURGERY, ELECTIVE: Primary | ICD-10-CM

## 2021-10-06 DIAGNOSIS — J34.3 HYPERTROPHY OF INFERIOR NASAL TURBINATE: ICD-10-CM

## 2021-10-06 PROCEDURE — 99244 OFF/OP CNSLTJ NEW/EST MOD 40: CPT | Performed by: OTOLARYNGOLOGY

## 2021-10-06 NOTE — PROGRESS NOTES
[facility]  Follow up      Patient Name: Catherine Ramírez  Medical Record Number:  [id number]  Primary Care Physician:  Maria Esther Dixon DO  Date of Consultation: 10/6/2021    Chief Complaint: Nasal congestion        Interval History  Patient presents for evaluation of nasal congestion. He says that for as long as he can remember is not been able to breathe out of his left side. He is not sure if he broke his nose or if he was just born this way. He said that it is even worse when he lies down. He feels as though he cannot breathe out of the right side at that time. This gives him a lot of anxiety and panic attacks. He said that he has tried multiple different medications including nasal steroids. His sense of smell is okay. REVIEW OF SYSTEMS  As above  PHYSICAL EXAM  GENERAL: No Acute Distress, Alert and Oriented, no Hoarseness, strong voice  EYES: EOMI, Anti-icteric  HENT:   Head: Normocephalic and atraumatic. Face:  Symmetric, facial nerve intact, no sinus tenderness  Right Ear: Normal external ear, normal external auditory canal, intact tympanic membrane with normal mobility and aerated middle ear  Left Ear: Normal external ear, normal external auditory canal, intact tympanic membrane with normal mobility and aerated middle ear  Mouth/Oral Cavity:  normal lips, Uvula is midline  Oropharynx/Larynx:  normal oropharynx, cryptic tonsils  Nose: Patient has a bit of a deviation of the mid cartilaginous nose to the right side. On inspiration he has essentially no air movement through the left side. He has quite a bit of valve collapse on the right. Improved with modified Emma maneuver. Anterior a rhinoscopy shows a severe leftward septal deviation causing a 100% obstruction of the left nasal cavity. Has a large turbinate on the right side. Difficult to see the turbinate on the left.   NECK: Normal range of motion, no thyromegaly, trachea is midline, no lymphadenopathy, no neck masses, no crepitus          ASSESSMENT/PLAN  1. Nasal congestion  This patient has a severe septal deviation. Deviation extends from the floor the nasal cavity to the dorsal septum. It is obstructing the left side as well as significantly narrowing the nasal valve. On the right side he has quite a bit of valve collapse. He also has a large turbinate on the right side at least.  Likely has a large turbinate left, but is obstructed by the deviated septum. He certainly needs a septoplasty. I feel as though given the extent tension all the way to the dorsal septum it is creating a very narrow nasal valve in the left side as well. In addition he has valve collapse on the right side. In order to get the best result I think that addressing the dorsal septal deviation as well as placing  grafts to widen the nasal valve angle would give him better results than just a septoplasty. We will do this using an open septorhinoplasty approach. He understands the risk of a septoplasty with valve repair including ongoing nasal obstruction, slight cosmetic changes to the nose, nosebleed and need for additional surgery. The patient says he is very eager to have this done. 2. Deviated septum  Septoplasty    3. Nasal valve collapse   grafts    4. Hypertrophy of inferior nasal turbinate  Bilateral turbinate reduction              I have performed a head and neck physical exam personally or was physically present during the key or critical portions of the service. This note was generated completely or in part utilizing Dragon dictation speech recognition software. Occasionally, words are mistranscribed and despite editing, the text may contain inaccuracies due to incorrect word recognition. If further clarification is needed please contact the office at [phone].

## 2021-10-20 NOTE — PROGRESS NOTES
C-Difficile admission screening and protocol:     * Admitted with diarrhea?no     *Prior history of C-Diff. In last 3 months?no     *Antibiotic use in the past 6-8 weeks?no     *Prior hospitalization or nursing home in the last month?    no  Preoperative Screening for Elective Surgery/Invasive Procedures While COVID-19 present in the community     Have you tested positive or have been told to self-isolate for COVID-19 like symptoms within the past 28 days? no   Do you currently have any of the following symptoms?no  o Fever >100.0 F or 99.9 F in immunocompromised patients? o New onset cough, shortness of breath or difficulty breathing?  o New onset sore throat, myalgia (muscle aches and pains), headache, loss of taste/smell or diarrhea?  Have you had a potential exposure to COVID-19 within the past 14 days by:no  o Close contact with a confirmed case? o Close contact with a healthcare worker,  or essential infrastructure worker (grocery store, [employer], gas station, public utilities or transportation)? o Do you reside in a congregate setting such as; skilled nursing facility, adult home, correctional facility, homeless shelter or other institutional setting?  o Have you had recent travel to a known COVID-19 hotspot? Indicate if the patient has a positive screen by answering yes to one or more of the above questions. Patients who test positive or screen positive prior to surgery or on the day of surgery should be evaluated in conjunction with the surgeon/proceduralist/anesthesiologist to determine the urgency of the procedure. SAFETY FIRST. .call before you fall      [facility] time___0825_________        Surgery time___1025_________    Take the following medications with a sip of water: Follow your MD/Surgeons pre-procedure instructions regarding your medications    Do not eat or drink anything after 12:00 midnight prior to your surgery. This includes water chewing gum, mints and ice chips. You may brush your teeth and gargle the morning of your surgery, but do not swallow the water     Please see your family doctor/pediatrician for a history and physical and/or concerning medications. Bring any test results/reports from your physicians office. If you are under the care of a heart doctor or specialist doctor, please be aware that you may be asked to them for clearance    You may be asked to stop blood thinners such as Coumadin, Plavix, Fragmin, Lovenox, etc., or any anti-inflammatories such as:  Aspirin, Ibuprofen, Advil, Naproxen prior to your surgery. We also ask that you stop any OTC medications such as fish oil, vitamin E, glucosamine, garlic, Multivitamins, COQ 10, etc.    We ask that you do not smoke 24 hours prior to surgery  We ask that you do not  drink any alcoholic beverages 24 hours prior to surgery     You must make arrangements for a responsible adult to take you home after your surgery. For your safety you will not be allowed to leave alone or drive yourself home. Your surgery will be cancelled if you do not have a ride home. Also for your safety, it is strongly suggested that someone stay with you the first 24 hours after your surgery. A parent or legal guardian must accompany a child scheduled for surgery and plan to stay at the hospital until the child is discharged. Please do not bring other children with you. For your comfort, please wear simple loose fitting clothing to the hospital.  Please do not bring valuables. Do not wear any make-up or nail polish on your fingers or toes      For your safety, please do not wear any jewelry or body piercing's on the day of surgery. All jewelry must be removed. If you have dentures, they will be removed before going to operating room. For your convenience, we will provide you with a container.     If you wear contact lenses or glasses, they will be removed, please bring a case for them. If you have a living will and a durable power of  for healthcare, please bring in a copy. As part of our patient safety program to minimize surgical site infections, we ask you to do the following:    · Please notify your surgeon if you develop any illness between         now and the  day of your surgery. · This includes a cough, cold, fever, sore throat, nausea,         or vomiting, and diarrhea, etc.  ·  Please notify your surgeon if you experience dizziness, shortness         of breath or blurred vision between now and the time of your surgery. Do not shave your operative site 96 hours prior to surgery. For face and neck surgery, men may use an electric razor 48 hours   prior to surgery. You may shower the night before surgery or the morning of   your surgery with an antibacterial soap. You will need to bring a photo ID and insurance card    [facility] has an onsite pharmacy, would you like to utilize our pharmacy     If you will be staying overnight and use a C-pap machine, please bring   your C-pap to hospital     Our goal is to provide you with excellent care, therefore, visitors will be limited to two(2) in the room at a time so that we may focus on providing this care for you. Please contact pre-admission testing if you have any further questions. [facility] phone number:  [phone] PAT fax number:  [phone]  Please note these are generalized instructions for all surgical cases, you may be provided with more specific instructions according to your surgery.

## 2021-10-20 NOTE — PROGRESS NOTES
Phone message left to call [facility] dept at [phone]  for history review and surgery instructions.

## 2021-10-25 ENCOUNTER — ANESTHESIA EVENT (OUTPATIENT)
Dept: OPERATING ROOM | Age: 41
End: 2021-10-25
Payer: COMMERCIAL

## 2021-10-26 ENCOUNTER — HOSPITAL ENCOUNTER (OUTPATIENT)
Age: 41
Setting detail: OUTPATIENT SURGERY
Discharge: HOME | End: 2021-10-26
Attending: OTOLARYNGOLOGY | Admitting: OTOLARYNGOLOGY
Payer: COMMERCIAL

## 2021-10-26 ENCOUNTER — ANESTHESIA (OUTPATIENT)
Dept: OPERATING ROOM | Age: 41
End: 2021-10-26
Payer: COMMERCIAL

## 2021-10-26 VITALS
HEART RATE: 96 BPM | OXYGEN SATURATION: 95 % | HEIGHT: 62 IN | WEIGHT: 167.99 LBS | DIASTOLIC BLOOD PRESSURE: 97 MMHG | TEMPERATURE: 96.9 F | SYSTOLIC BLOOD PRESSURE: 129 MMHG | BODY MASS INDEX: 30.91 KG/M2 | RESPIRATION RATE: 16 BRPM

## 2021-10-26 VITALS
DIASTOLIC BLOOD PRESSURE: 83 MMHG | TEMPERATURE: 96.8 F | SYSTOLIC BLOOD PRESSURE: 124 MMHG | RESPIRATION RATE: 1 BRPM | OXYGEN SATURATION: 98 %

## 2021-10-26 DIAGNOSIS — R09.81 NASAL CONGESTION: Primary | ICD-10-CM

## 2021-10-26 PROCEDURE — 30520 REPAIR OF NASAL SEPTUM: CPT | Performed by: OTOLARYNGOLOGY

## 2021-10-26 PROCEDURE — 2580000003 HC RX 258: Performed by: NURSE ANESTHETIST, CERTIFIED REGISTERED

## 2021-10-26 PROCEDURE — 3700000001 HC ADD 15 MINUTES (ANESTHESIA): Performed by: OTOLARYNGOLOGY

## 2021-10-26 PROCEDURE — 7100000001 HC PACU RECOVERY - ADDTL 15 MIN: Performed by: OTOLARYNGOLOGY

## 2021-10-26 PROCEDURE — 2580000003 HC RX 258: Performed by: OTOLARYNGOLOGY

## 2021-10-26 PROCEDURE — 2720000010 HC SURG SUPPLY STERILE: Performed by: OTOLARYNGOLOGY

## 2021-10-26 PROCEDURE — 30465 REPAIR NASAL STENOSIS: CPT | Performed by: OTOLARYNGOLOGY

## 2021-10-26 PROCEDURE — 2500000003 HC RX 250 WO HCPCS: Performed by: NURSE ANESTHETIST, CERTIFIED REGISTERED

## 2021-10-26 PROCEDURE — 6360000002 HC RX W HCPCS: Performed by: NURSE ANESTHETIST, CERTIFIED REGISTERED

## 2021-10-26 PROCEDURE — 2580000003 HC RX 258: Performed by: ANESTHESIOLOGY

## 2021-10-26 PROCEDURE — 7100000011 HC PHASE II RECOVERY - ADDTL 15 MIN: Performed by: OTOLARYNGOLOGY

## 2021-10-26 PROCEDURE — 7100000010 HC PHASE II RECOVERY - FIRST 15 MIN: Performed by: OTOLARYNGOLOGY

## 2021-10-26 PROCEDURE — 30140 RESECT INFERIOR TURBINATE: CPT | Performed by: OTOLARYNGOLOGY

## 2021-10-26 PROCEDURE — 3700000000 HC ANESTHESIA ATTENDED CARE: Performed by: OTOLARYNGOLOGY

## 2021-10-26 PROCEDURE — 3600000004 HC SURGERY LEVEL 4 BASE: Performed by: OTOLARYNGOLOGY

## 2021-10-26 PROCEDURE — 6370000000 HC RX 637 (ALT 250 FOR IP): Performed by: ANESTHESIOLOGY

## 2021-10-26 PROCEDURE — 7100000000 HC PACU RECOVERY - FIRST 15 MIN: Performed by: OTOLARYNGOLOGY

## 2021-10-26 PROCEDURE — 6360000002 HC RX W HCPCS: Performed by: ANESTHESIOLOGY

## 2021-10-26 PROCEDURE — 3600000014 HC SURGERY LEVEL 4 ADDTL 15MIN: Performed by: OTOLARYNGOLOGY

## 2021-10-26 PROCEDURE — 2709999900 HC NON-CHARGEABLE SUPPLY: Performed by: OTOLARYNGOLOGY

## 2021-10-26 PROCEDURE — 2500000003 HC RX 250 WO HCPCS: Performed by: OTOLARYNGOLOGY

## 2021-10-26 PROCEDURE — 6370000000 HC RX 637 (ALT 250 FOR IP): Performed by: OTOLARYNGOLOGY

## 2021-10-26 RX ORDER — SODIUM CHLORIDE 0.9 % (FLUSH) 0.9 %
10 SYRINGE (ML) INJECTION PRN
Status: DISCONTINUED | OUTPATIENT
Start: 2021-10-26 | End: 2021-10-26 | Stop reason: HOSPADM

## 2021-10-26 RX ORDER — FENTANYL CITRATE 50 UG/ML
25 INJECTION, SOLUTION INTRAMUSCULAR; INTRAVENOUS EVERY 5 MIN PRN
Status: DISCONTINUED | OUTPATIENT
Start: 2021-10-26 | End: 2021-10-26 | Stop reason: HOSPADM

## 2021-10-26 RX ORDER — GLYCOPYRROLATE 0.2 MG/ML
INJECTION INTRAMUSCULAR; INTRAVENOUS PRN
Status: DISCONTINUED | OUTPATIENT
Start: 2021-10-26 | End: 2021-10-26 | Stop reason: SDUPTHER

## 2021-10-26 RX ORDER — SODIUM CHLORIDE 9 MG/ML
INJECTION, SOLUTION INTRAVENOUS CONTINUOUS PRN
Status: DISCONTINUED | OUTPATIENT
Start: 2021-10-26 | End: 2021-10-26 | Stop reason: SDUPTHER

## 2021-10-26 RX ORDER — OXYCODONE HYDROCHLORIDE 5 MG/1
5 TABLET ORAL PRN
Status: COMPLETED | OUTPATIENT
Start: 2021-10-26 | End: 2021-10-26

## 2021-10-26 RX ORDER — LABETALOL HYDROCHLORIDE 5 MG/ML
INJECTION, SOLUTION INTRAVENOUS PRN
Status: DISCONTINUED | OUTPATIENT
Start: 2021-10-26 | End: 2021-10-26 | Stop reason: SDUPTHER

## 2021-10-26 RX ORDER — PROPOFOL 10 MG/ML
INJECTION, EMULSION INTRAVENOUS PRN
Status: DISCONTINUED | OUTPATIENT
Start: 2021-10-26 | End: 2021-10-26 | Stop reason: SDUPTHER

## 2021-10-26 RX ORDER — SUCCINYLCHOLINE/SOD CL,ISO/PF 200MG/10ML
SYRINGE (ML) INTRAVENOUS PRN
Status: DISCONTINUED | OUTPATIENT
Start: 2021-10-26 | End: 2021-10-26 | Stop reason: SDUPTHER

## 2021-10-26 RX ORDER — ESMOLOL HYDROCHLORIDE 10 MG/ML
INJECTION INTRAVENOUS PRN
Status: DISCONTINUED | OUTPATIENT
Start: 2021-10-26 | End: 2021-10-26 | Stop reason: SDUPTHER

## 2021-10-26 RX ORDER — MAGNESIUM HYDROXIDE 1200 MG/15ML
LIQUID ORAL CONTINUOUS PRN
Status: COMPLETED | OUTPATIENT
Start: 2021-10-26 | End: 2021-10-26

## 2021-10-26 RX ORDER — ONDANSETRON 2 MG/ML
INJECTION INTRAMUSCULAR; INTRAVENOUS PRN
Status: DISCONTINUED | OUTPATIENT
Start: 2021-10-26 | End: 2021-10-26 | Stop reason: SDUPTHER

## 2021-10-26 RX ORDER — SODIUM CHLORIDE 9 MG/ML
25 INJECTION, SOLUTION INTRAVENOUS PRN
Status: DISCONTINUED | OUTPATIENT
Start: 2021-10-26 | End: 2021-10-26 | Stop reason: HOSPADM

## 2021-10-26 RX ORDER — ONDANSETRON 2 MG/ML
4 INJECTION INTRAMUSCULAR; INTRAVENOUS
Status: DISCONTINUED | OUTPATIENT
Start: 2021-10-26 | End: 2021-10-26 | Stop reason: HOSPADM

## 2021-10-26 RX ORDER — SODIUM CHLORIDE 0.9 % (FLUSH) 0.9 %
10 SYRINGE (ML) INJECTION EVERY 12 HOURS SCHEDULED
Status: DISCONTINUED | OUTPATIENT
Start: 2021-10-26 | End: 2021-10-26 | Stop reason: HOSPADM

## 2021-10-26 RX ORDER — SODIUM CHLORIDE 9 MG/ML
INJECTION, SOLUTION INTRAVENOUS CONTINUOUS
Status: DISCONTINUED | OUTPATIENT
Start: 2021-10-26 | End: 2021-10-26 | Stop reason: HOSPADM

## 2021-10-26 RX ORDER — OXYCODONE HYDROCHLORIDE 10 MG/1
10 TABLET ORAL PRN
Status: COMPLETED | OUTPATIENT
Start: 2021-10-26 | End: 2021-10-26

## 2021-10-26 RX ORDER — KETAMINE HCL IN NACL, ISO-OSM 100MG/10ML
SYRINGE (ML) INJECTION PRN
Status: DISCONTINUED | OUTPATIENT
Start: 2021-10-26 | End: 2021-10-26 | Stop reason: SDUPTHER

## 2021-10-26 RX ORDER — LIDOCAINE HYDROCHLORIDE AND EPINEPHRINE 10; 10 MG/ML; UG/ML
INJECTION, SOLUTION INFILTRATION; PERINEURAL
Status: COMPLETED | OUTPATIENT
Start: 2021-10-26 | End: 2021-10-26

## 2021-10-26 RX ORDER — ROCURONIUM BROMIDE 10 MG/ML
INJECTION, SOLUTION INTRAVENOUS PRN
Status: DISCONTINUED | OUTPATIENT
Start: 2021-10-26 | End: 2021-10-26 | Stop reason: SDUPTHER

## 2021-10-26 RX ORDER — MORPHINE SULFATE 2 MG/ML
2 INJECTION, SOLUTION INTRAMUSCULAR; INTRAVENOUS EVERY 5 MIN PRN
Status: DISCONTINUED | OUTPATIENT
Start: 2021-10-26 | End: 2021-10-26 | Stop reason: HOSPADM

## 2021-10-26 RX ORDER — BACITRACIN ZINC AND POLYMYXIN B SULFATE 500; 1000 [USP'U]/G; [USP'U]/G
OINTMENT TOPICAL
Status: COMPLETED | OUTPATIENT
Start: 2021-10-26 | End: 2021-10-26

## 2021-10-26 RX ORDER — MORPHINE SULFATE 2 MG/ML
1 INJECTION, SOLUTION INTRAMUSCULAR; INTRAVENOUS EVERY 5 MIN PRN
Status: DISCONTINUED | OUTPATIENT
Start: 2021-10-26 | End: 2021-10-26 | Stop reason: HOSPADM

## 2021-10-26 RX ORDER — MIDAZOLAM HYDROCHLORIDE 1 MG/ML
INJECTION INTRAMUSCULAR; INTRAVENOUS PRN
Status: DISCONTINUED | OUTPATIENT
Start: 2021-10-26 | End: 2021-10-26 | Stop reason: SDUPTHER

## 2021-10-26 RX ORDER — FENTANYL CITRATE 50 UG/ML
50 INJECTION, SOLUTION INTRAMUSCULAR; INTRAVENOUS EVERY 5 MIN PRN
Status: DISCONTINUED | OUTPATIENT
Start: 2021-10-26 | End: 2021-10-26 | Stop reason: HOSPADM

## 2021-10-26 RX ORDER — OXYMETAZOLINE HYDROCHLORIDE 0.05 G/100ML
SPRAY NASAL
Status: COMPLETED | OUTPATIENT
Start: 2021-10-26 | End: 2021-10-26

## 2021-10-26 RX ORDER — MEPERIDINE HYDROCHLORIDE 25 MG/ML
12.5 INJECTION INTRAMUSCULAR; INTRAVENOUS; SUBCUTANEOUS EVERY 5 MIN PRN
Status: DISCONTINUED | OUTPATIENT
Start: 2021-10-26 | End: 2021-10-26 | Stop reason: HOSPADM

## 2021-10-26 RX ORDER — ECHINACEA PURPUREA EXTRACT 125 MG
1 TABLET ORAL 4 TIMES DAILY
Qty: 1 EACH | Refills: 3 | Status: SHIPPED | OUTPATIENT
Start: 2021-10-26 | End: 2022-08-16 | Stop reason: ALTCHOICE

## 2021-10-26 RX ORDER — FENTANYL CITRATE 50 UG/ML
INJECTION, SOLUTION INTRAMUSCULAR; INTRAVENOUS PRN
Status: DISCONTINUED | OUTPATIENT
Start: 2021-10-26 | End: 2021-10-26 | Stop reason: SDUPTHER

## 2021-10-26 RX ORDER — AMOXICILLIN AND CLAVULANATE POTASSIUM 875; 125 MG/1; MG/1
1 TABLET, FILM COATED ORAL 2 TIMES DAILY
Qty: 14 TABLET | Refills: 0 | Status: SHIPPED | OUTPATIENT
Start: 2021-10-26 | End: 2021-11-02

## 2021-10-26 RX ORDER — DEXAMETHASONE SODIUM PHOSPHATE 4 MG/ML
INJECTION, SOLUTION INTRA-ARTICULAR; INTRALESIONAL; INTRAMUSCULAR; INTRAVENOUS; SOFT TISSUE PRN
Status: DISCONTINUED | OUTPATIENT
Start: 2021-10-26 | End: 2021-10-26 | Stop reason: SDUPTHER

## 2021-10-26 RX ORDER — HYDROCODONE BITARTRATE AND ACETAMINOPHEN 5; 325 MG/1; MG/1
1 TABLET ORAL EVERY 6 HOURS PRN
Qty: 20 TABLET | Refills: 0 | Status: SHIPPED | OUTPATIENT
Start: 2021-10-26 | End: 2021-10-31

## 2021-10-26 RX ORDER — LIDOCAINE HYDROCHLORIDE 20 MG/ML
INJECTION, SOLUTION EPIDURAL; INFILTRATION; INTRACAUDAL; PERINEURAL PRN
Status: DISCONTINUED | OUTPATIENT
Start: 2021-10-26 | End: 2021-10-26 | Stop reason: SDUPTHER

## 2021-10-26 RX ADMIN — SODIUM CHLORIDE: 9 INJECTION, SOLUTION INTRAVENOUS at 09:15

## 2021-10-26 RX ADMIN — Medication 15 MG: at 11:33

## 2021-10-26 RX ADMIN — SODIUM CHLORIDE: 9 INJECTION, SOLUTION INTRAVENOUS at 11:28

## 2021-10-26 RX ADMIN — FENTANYL CITRATE 100 MCG: 50 INJECTION INTRAMUSCULAR; INTRAVENOUS at 11:30

## 2021-10-26 RX ADMIN — LABETALOL HYDROCHLORIDE 10 MG: 5 INJECTION, SOLUTION INTRAVENOUS at 12:59

## 2021-10-26 RX ADMIN — FENTANYL CITRATE 50 MCG: 50 INJECTION INTRAMUSCULAR; INTRAVENOUS at 14:19

## 2021-10-26 RX ADMIN — HYDROMORPHONE HYDROCHLORIDE 1 MG: 1 INJECTION, SOLUTION INTRAMUSCULAR; INTRAVENOUS; SUBCUTANEOUS at 11:50

## 2021-10-26 RX ADMIN — Medication 140 MG: at 11:33

## 2021-10-26 RX ADMIN — OXYCODONE 5 MG: 5 TABLET ORAL at 15:12

## 2021-10-26 RX ADMIN — ESMOLOL HYDROCHLORIDE 30 MG: 100 INJECTION, SOLUTION INTRAVENOUS at 11:50

## 2021-10-26 RX ADMIN — PROPOFOL 50 MG: 10 INJECTION, EMULSION INTRAVENOUS at 11:44

## 2021-10-26 RX ADMIN — GLYCOPYRROLATE 0.2 MG: 0.2 INJECTION, SOLUTION INTRAMUSCULAR; INTRAVENOUS at 11:39

## 2021-10-26 RX ADMIN — ONDANSETRON 4 MG: 2 INJECTION INTRAMUSCULAR; INTRAVENOUS at 11:39

## 2021-10-26 RX ADMIN — LIDOCAINE HYDROCHLORIDE 60 MG: 20 INJECTION, SOLUTION EPIDURAL; INFILTRATION; INTRACAUDAL; PERINEURAL at 11:33

## 2021-10-26 RX ADMIN — SUGAMMADEX 200 MG: 100 INJECTION, SOLUTION INTRAVENOUS at 13:26

## 2021-10-26 RX ADMIN — DEXAMETHASONE SODIUM PHOSPHATE 8 MG: 4 INJECTION, SOLUTION INTRAMUSCULAR; INTRAVENOUS at 11:39

## 2021-10-26 RX ADMIN — Medication 15 MG: at 11:43

## 2021-10-26 RX ADMIN — FENTANYL CITRATE 100 MCG: 50 INJECTION INTRAMUSCULAR; INTRAVENOUS at 13:56

## 2021-10-26 RX ADMIN — FENTANYL CITRATE 25 MCG: 50 INJECTION INTRAMUSCULAR; INTRAVENOUS at 14:28

## 2021-10-26 RX ADMIN — SODIUM CHLORIDE: 9 INJECTION, SOLUTION INTRAVENOUS at 12:13

## 2021-10-26 RX ADMIN — MIDAZOLAM 2 MG: 1 INJECTION INTRAMUSCULAR; INTRAVENOUS at 11:29

## 2021-10-26 RX ADMIN — ESMOLOL HYDROCHLORIDE 30 MG: 100 INJECTION, SOLUTION INTRAVENOUS at 11:58

## 2021-10-26 RX ADMIN — ROCURONIUM BROMIDE 50 MG: 10 INJECTION INTRAVENOUS at 11:37

## 2021-10-26 RX ADMIN — PROPOFOL 200 MG: 10 INJECTION, EMULSION INTRAVENOUS at 11:33

## 2021-10-26 ASSESSMENT — PULMONARY FUNCTION TESTS
PIF_VALUE: 19
PIF_VALUE: 3
PIF_VALUE: 1
PIF_VALUE: 19
PIF_VALUE: 18
PIF_VALUE: 20
PIF_VALUE: 18
PIF_VALUE: 1
PIF_VALUE: 19
PIF_VALUE: 6
PIF_VALUE: 18
PIF_VALUE: 20
PIF_VALUE: 15
PIF_VALUE: 18
PIF_VALUE: 1
PIF_VALUE: 2
PIF_VALUE: 18
PIF_VALUE: 18
PIF_VALUE: 19
PIF_VALUE: 18
PIF_VALUE: 18
PIF_VALUE: 2
PIF_VALUE: 19
PIF_VALUE: 18
PIF_VALUE: 19
PIF_VALUE: 18
PIF_VALUE: 16
PIF_VALUE: 3
PIF_VALUE: 18
PIF_VALUE: 18
PIF_VALUE: 17
PIF_VALUE: 1
PIF_VALUE: 4
PIF_VALUE: 18
PIF_VALUE: 18
PIF_VALUE: 2
PIF_VALUE: 16
PIF_VALUE: 19
PIF_VALUE: 18
PIF_VALUE: 17
PIF_VALUE: 19
PIF_VALUE: 19
PIF_VALUE: 18
PIF_VALUE: 17
PIF_VALUE: 3
PIF_VALUE: 18
PIF_VALUE: 17
PIF_VALUE: 20
PIF_VALUE: 19
PIF_VALUE: 18
PIF_VALUE: 19
PIF_VALUE: 17
PIF_VALUE: 20
PIF_VALUE: 18
PIF_VALUE: 19
PIF_VALUE: 18
PIF_VALUE: 20
PIF_VALUE: 19
PIF_VALUE: 20
PIF_VALUE: 18
PIF_VALUE: 1
PIF_VALUE: 17
PIF_VALUE: 18
PIF_VALUE: 20
PIF_VALUE: 19
PIF_VALUE: 19
PIF_VALUE: 18
PIF_VALUE: 14
PIF_VALUE: 17
PIF_VALUE: 18
PIF_VALUE: 20
PIF_VALUE: 18
PIF_VALUE: 20
PIF_VALUE: 19
PIF_VALUE: 16
PIF_VALUE: 18
PIF_VALUE: 20
PIF_VALUE: 18
PIF_VALUE: 1
PIF_VALUE: 14
PIF_VALUE: 3
PIF_VALUE: 19
PIF_VALUE: 1
PIF_VALUE: 18
PIF_VALUE: 19
PIF_VALUE: 18
PIF_VALUE: 17
PIF_VALUE: 18
PIF_VALUE: 18
PIF_VALUE: 20
PIF_VALUE: 17
PIF_VALUE: 18
PIF_VALUE: 19
PIF_VALUE: 14
PIF_VALUE: 18
PIF_VALUE: 17
PIF_VALUE: 18
PIF_VALUE: 19
PIF_VALUE: 19
PIF_VALUE: 20
PIF_VALUE: 19
PIF_VALUE: 18
PIF_VALUE: 17
PIF_VALUE: 18
PIF_VALUE: 18
PIF_VALUE: 19
PIF_VALUE: 18
PIF_VALUE: 1
PIF_VALUE: 2
PIF_VALUE: 78
PIF_VALUE: 18
PIF_VALUE: 20
PIF_VALUE: 19
PIF_VALUE: 18
PIF_VALUE: 16
PIF_VALUE: 20
PIF_VALUE: 18
PIF_VALUE: 17
PIF_VALUE: 18
PIF_VALUE: 1

## 2021-10-26 ASSESSMENT — PAIN SCALES - GENERAL
PAINLEVEL_OUTOF10: 6
PAINLEVEL_OUTOF10: 4
PAINLEVEL_OUTOF10: 6
PAINLEVEL_OUTOF10: 4
PAINLEVEL_OUTOF10: 6
PAINLEVEL_OUTOF10: 8
PAINLEVEL_OUTOF10: 4

## 2021-10-26 ASSESSMENT — PAIN DESCRIPTION - ORIENTATION
ORIENTATION: MID

## 2021-10-26 ASSESSMENT — PAIN DESCRIPTION - LOCATION
LOCATION: NOSE

## 2021-10-26 ASSESSMENT — PAIN DESCRIPTION - DESCRIPTORS
DESCRIPTORS: BURNING

## 2021-10-26 ASSESSMENT — PAIN DESCRIPTION - PAIN TYPE
TYPE: SURGICAL PAIN

## 2021-10-26 ASSESSMENT — PAIN DESCRIPTION - PROGRESSION
CLINICAL_PROGRESSION: NOT CHANGED
CLINICAL_PROGRESSION: GRADUALLY WORSENING
CLINICAL_PROGRESSION: NOT CHANGED
CLINICAL_PROGRESSION: GRADUALLY IMPROVING
CLINICAL_PROGRESSION: GRADUALLY WORSENING

## 2021-10-26 ASSESSMENT — PAIN - FUNCTIONAL ASSESSMENT: PAIN_FUNCTIONAL_ASSESSMENT: 0-10

## 2021-10-26 ASSESSMENT — PAIN DESCRIPTION - FREQUENCY
FREQUENCY: CONTINUOUS

## 2021-10-26 ASSESSMENT — PAIN DESCRIPTION - ONSET
ONSET: ON-GOING
ONSET: ON-GOING
ONSET: AWAKENED FROM SLEEP
ONSET: ON-GOING
ONSET: PROGRESSIVE

## 2021-10-26 NOTE — PROGRESS NOTES
Pt dozing at times. Pain slightly improved in nose. Changed mustache dressing x1 for sanguinous drainage. Discharge instructions reviewed with pt and girlfriend. Both express an understanding of instructions. Pt dressing with girlfriend's assistance.

## 2021-10-26 NOTE — H&P
I have reviewed this patient's history and physical.  There have been no significant changes. The patient was counseled at length about the risks of yumiko Covid-19 during their perioperative period and any recovery window from their procedure. The patient was made aware that yumiko Covid-19  may worsen their prognosis for recovering from their procedure  and lend to a higher morbidity and/or mortality risk. All material risks, benefits, and reasonable alternatives including postponing the procedure were discussed. The patient does wish to proceed with the procedure at this time. The patient understands the risk of a rhinoplasty and inferior turbinate reduction including cosmetic changes to nose, ongoing obstruction, infection, bleeding and septal perforation. He has agreed to proceed.

## 2021-10-26 NOTE — PROGRESS NOTES
Pt awake on arrival to phase II. Burning 6/10 presently in nose. VSS. Given ice water, ice chips, and crackers. Smooth Levin (girlfriend) to room. Call light within reach.

## 2021-10-26 NOTE — PROGRESS NOTES
CLINICAL PHARMACY NOTE: MEDS TO BEDS    Total # of Prescriptions Filled: 1   The following medications were delivered to the patient:  Current Discharge Medication List      START taking these medications    Details   HYDROcodone-acetaminophen (NORCO) 5-325 MG per tablet Take 1 tablet by mouth every 6 hours as needed for Pain for up to 5 days. Intended supply: 5 days.  Take lowest dose possible to manage pain  Qty: 20 tablet, Refills: 0    Comments: Reduce doses taken as pain becomes manageable  Associated Diagnoses: Nasal congestion      sodium chloride (ALTAMIST SPRAY) 0.65 % nasal spray 1 spray by Nasal route 4 times daily For 1 week  Qty: 1 each, Refills: 3      amoxicillin-clavulanate (AUGMENTIN) 875-125 MG per tablet Take 1 tablet by mouth 2 times daily for 7 days  Qty: 14 tablet, Refills: 0         · Did not dispense altamist or augmentin  ·     Additional Documentation:

## 2021-10-26 NOTE — ANESTHESIA POSTPROCEDURE EVALUATION
Department of Anesthesiology  Postprocedure Note    Patient: Rohan Young  MRN: [id number]  YOB: 1980  Date of evaluation: 10/26/2021  Time:  3:25 PM     Procedure Summary     Date: 10/26/21 Room / Location: [address] [place] [address]    Anesthesia Start: 1128 Anesthesia Stop: 1402    Procedures:       SEPTOPLASTY, BILATERAL NASAL VALVE REPAIR (Bilateral )      INFERIOR TURBINATE REDUCTION (N/A ) Diagnosis:       Nasal congestion      Deviated septum      Hypertrophy of inferior nasal turbinate      (NASAL CONGESTION, DEVIATED SEPTUM, INFERIOR TURBINATE REDUCTION, NASAL VALVE STENOSIS)    Surgeons: Adan Villafuerte MD Responsible Provider: Erasmo Grimaldo MD    Anesthesia Type: General ASA Status: 2          Anesthesia Type: General    Debra Phase I: Debra Score: 10    Debra Phase II: Debra Score: 10    Last vitals: Reviewed and per EMR flowsheets.        Anesthesia Post Evaluation    Patient location during evaluation: PACU  Level of consciousness: awake and alert  Airway patency: patent  Nausea & Vomiting: no nausea and no vomiting  Complications: no  Cardiovascular status: blood pressure returned to baseline  Respiratory status: acceptable  Hydration status: euvolemic  Comments: Postoperative Anesthesia Note    Name:    Rohan Young  MRN:      [id number]    Patient Vitals in the past 12 hrs:  10/26/21 1512, BP:(!) 129/97, Pulse:96, Resp:16, SpO2:95 %  10/26/21 1448, BP:(!) 137/90, Temp:96.9 °F (36.1 °C), Temp src:Temporal, Pulse:99, Resp:16, SpO2:93 %  10/26/21 1437, Temp:97.4 °F (36.3 °C), Temp src:Temporal  10/26/21 1435, BP:(!) 135/102, Pulse:98, Resp:13, SpO2:90 %  10/26/21 1430, BP:(!) 146/104, Pulse:96, Resp:11, SpO2:91 %  10/26/21 1425, BP:(!) 125/91, Pulse:94, Resp:15, SpO2:90 %  10/26/21 1420, BP:(!) 141/100, Pulse:97, Resp:18, SpO2:93 %  10/26/21 1415, BP:(!) 136/101, Pulse:94, Resp:10, SpO2:93 %  10/26/21 1410, BP:(!) 146/101, Pulse:94, Resp:17, SpO2:93

## 2021-10-26 NOTE — ANESTHESIA PRE PROCEDURE
Department of Anesthesiology  Preprocedure Note       Name:  Elias Carter   Age:  45 y.o.  :  1980                                          MRN:  [id number]         Date:  10/26/2021      Surgeon: Max Coe):  Mesfin Locke MD    Procedure: Procedure(s):  SEPTOPLASTY, BILATERAL NASAL VALVE REPAIR  INFERIOR TURBINATE REDUCTION    Medications prior to admission:   Prior to Admission medications    Medication Sig Start Date End Date Taking? Authorizing Provider   ALPRAZolam (XANAX) 1 MG tablet TAKE 1 TABLET BY MOUTH 3 TIMES DAILY AS NEEDED FOR ANXIETY FOR UP TO 30 DAYS. 9/27/21 10/27/21 Yes Cesar Crisostomo DO   albuterol sulfate  (90 Base) MCG/ACT inhaler INHALE 2 PUFFSBY MOUTH INTO THE LUNGS EVERY 4 HOURS AS NEEDED FOR SHORTNESS OF BREATH 21  Yes Nyasia Mccoy DO       Current medications:    Current Facility-Administered Medications   Medication Dose Route Frequency Provider Last Rate Last Admin    0.9 % sodium chloride infusion   IntraVENous Continuous Terry Chung  mL/hr at 10/26/21 0915 New Bag at 10/26/21 0915    sodium chloride flush 0.9 % injection 10 mL  10 mL IntraVENous 2 times per day Terry Chung MD        sodium chloride flush 0.9 % injection 10 mL  10 mL IntraVENous PRN Terry Chung MD        0.9 % sodium chloride infusion  25 mL IntraVENous PRN Terry Chung MD           Allergies:     Allergies   Allergen Reactions    Prozac [Fluoxetine Hcl]      Sweating        Problem List:    Patient Active Problem List   Diagnosis Code    Anxiety F41.9    Degeneration of lumbar intervertebral disc M51.36    Major depression single episode, in partial remission ([facility] [address] F32.4    Former smoker Z87.891    Sprain of right ankle S93.401A    Lumbar disc herniation M51.26       Past Medical History:        Diagnosis Date    Anxiety     Degeneration of lumbar intervertebral disc     Workman's Comp / Dr. Maria Luisa Guevara Former smoker     Lumbar disc herniation 2018    Left L5-S1 on MRI    Major depression single episode, in partial remission ([facility] [address]        Past Surgical History:        Procedure Laterality Date    WISDOM TOOTH EXTRACTION         Social History:    Social History     Tobacco Use    Smoking status: Former Smoker     Packs/day: 1.00     Years: 15.00     Pack years: 15.00     Types: Cigarettes     Quit date: 2010     Years since quittin.2    Smokeless tobacco: Current User     Types: Chew    Tobacco comment: discussed oral cancer risks    Substance Use Topics    Alcohol use: Yes     Comment: Occasional                                Ready to quit: Not Answered  Counseling given: Not Answered  Comment: discussed oral cancer risks       Vital Signs (Current):   Vitals:    10/20/21 1637 10/26/21 0904   BP:  128/84   Pulse:  63   Resp:  16   Temp:  96.9 °F (36.1 °C)   TempSrc:  Temporal   SpO2:  97%   Weight: 170 lb (77.1 kg) 167 lb 15.9 oz (76.2 kg)   Height: 5' 2\" (1.575 m) 5' 2\" (1.575 m)                                              BP Readings from Last 3 Encounters:   10/26/21 128/84   10/06/21 (!) 136/92   20 120/80       NPO Status: Time of last liquid consumption:                         Time of last solid consumption:                         Date of last liquid consumption: 10/25/21                        Date of last solid food consumption: 10/25/21    BMI:   Wt Readings from Last 3 Encounters:   10/26/21 167 lb 15.9 oz (76.2 kg)   10/06/21 170 lb (77.1 kg)   20 165 lb (74.8 kg)     Body mass index is 30.73 kg/m².     CBC:   Lab Results   Component Value Date    WBC 8.2 2013    RBC 5.29 2013    HGB 14.6 2013    HCT 43.9 2013    MCV 82.8 2013    RDW 13.5 2013     2013       CMP:   Lab Results   Component Value Date     2013    K 4.3 2013     2013    CO2 27 2013    BUN 17 2013 CREATININE 1.1 06/13/2013    GFRAA >60 06/13/2013    GLUCOSE 70 06/13/2013    CALCIUM 9.5 06/13/2013    AST 21 06/13/2013    ALT 23 06/13/2013       POC Tests: No results for input(s): POCGLU, POCNA, POCK, POCCL, POCBUN, POCHEMO, POCHCT in the last 72 hours. Coags: No results found for: PROTIME, INR, APTT    HCG (If Applicable): No results found for: PREGTESTUR, PREGSERUM, HCG, HCGQUANT     ABGs: No results found for: PHART, PO2ART, HTA4QZC, VXH2XVS, BEART, Q3DNSIQV     Type & Screen (If Applicable):  No results found for: LABABO, LABRH    Drug/Infectious Status (If Applicable):  No results found for: HIV, HEPCAB    COVID-19 Screening (If Applicable):   Lab Results   Component Value Date    COVID19 Not Detected 12/16/2020           Anesthesia Evaluation  Patient summary reviewed and Nursing notes reviewed no history of anesthetic complications:   Airway: Mallampati: II  TM distance: >3 FB   Neck ROM: full  Mouth opening: > = 3 FB Dental: normal exam         Pulmonary:Negative Pulmonary ROS                             ROS comment: Ex smoker   Cardiovascular:Negative CV ROS  Exercise tolerance: good (>4 METS),           Rhythm: regular                      Neuro/Psych:   (+) psychiatric history:   (-) seizures, neuromuscular disease, TIA, CVA, headaches and depression/anxiety            GI/Hepatic/Renal: Neg GI/Hepatic/Renal ROS            Endo/Other: Negative Endo/Other ROS                    Abdominal:             Vascular: negative vascular ROS. Other Findings:           Anesthesia Plan      general     ASA 2       Induction: intravenous. MIPS: Postoperative opioids intended and Prophylactic antiemetics administered. Anesthetic plan and risks discussed with patient. Plan discussed with CRNA.                   Alena Habermann, MD   10/26/2021        This pre-anesthesia assessment may be used as a history and physical.    DOS STAFF ADDENDUM:    Pt seen and examined, chart reviewed (including anesthesia, drug and allergy history). No interval changes to history and physical examination. Anesthetic plan, risks, benefits, alternatives, and personnel involved discussed with patient. Patient verbalized an understanding and agrees to proceed.       Claudio Mathis MD  October 26, 2021  10:02 AM

## 2021-10-26 NOTE — PROGRESS NOTES
Samuel Gardner from [facility] in with prescription. Verified pt's name and  on prescription. Girlfriend present.

## 2021-10-26 NOTE — OP NOTE
[address] [facility] SURGERY  OPERATIVE REPORT    Patient Name: Bridgette Caldera  YOB: 1980  Medical Record Number:  [id number]  Billing Number:  [id number]  Date of Procedure: [unfilled]  Time: 1628    Pre Operative Diagnoses:   Nasal congestion  Deviated septum  Nasal valve collapse  Inferior turbinate hypertrophy     Post Operative Diagnoses:    Same           Procedure:  Bilateral nasal valve repair (99108)  Septoplasty (98846)  Inferior turbinate reduction (02863)       Surgeon: Carla Alvarez MD    OR Staff/ Assistant:  Circulator: Barbara Marie RN  Surgical Assistant: Myranda Desai Circulator: Yeimi Ortez RN  Relief Scrub: Ely Rios    Anesthesia:  General anesthesia. Findings:  1) severe leftward septal deviation. Bilateral narrow nasal valve with collapse of the right side. Bilateral inferior turbinate hypertrophy. Indications: This is a 45 y.o. male with nasal congestion secondary to severe septal deviation and valve collapse as well as inferior turbinate hypertrophy. He is here for a rhinoplasty and inferior turbinate reduction. Risks and benefits discussed with the patient including alternate treatment options, Informed consent was obtained, the patient elected to proceed with the planned procedure. DETAILS OF PROCEDURE(S):   The patient was brought to the operating room placed in supine position operating table. He underwent uncomplicated general anesthesia with endotracheal intubation. The head of bed was turned 180 degrees. Exam showed the severe leftward septal deviation. 10 mL 1% lidocaine with epinephrine was injected into the septum and nasal cavity. He was prepped and draped. The nasal vibrissae were trimmed. An inverted V columellar incision was planned. The columellar incision as well as bilateral marginal incisions were made.   The soft tissue was then reflected off of the lower lateral upper lateral cartilages. An incision was made down onto the nasal bone and a subperiosteal dissection was carried out. The anterior septum was then at identified. A left mucoperichondrial flap was raised. The left upper lateral cartilage was reflected off of the septum. Next a right mucoperichondrial flap was raised. The right upper lateral cartilage was then reflected off the septum. An L-shaped incision was then made into the severely deviated septum. The quadrangular cartilage was disarticulated from the vomer and maxillary crest and removed. This showed a severe deviation of the bony septum as well. This was sharply dissected. There was still a significant obstruction of the left nasal cavity from a thick maxillary crest that was removed with an osteotome. This resulted in a straighter nasal airway. Next the quadrangular cartilage that was removed was fashioned into 2  grafts. These were placed between the septum and the upper lateral cartilages. These were sutured in place with horizontal mattress 5-0 PDS sutures. This helped straighten the dorsal septum. Next an incision was made into the anterior aspect of each inferior turbinate. A submucosal pocket was formed. A submucosal pocket was performed with the Coblator. A small amount of bone was also removed the Coblator on each side. The bilateral inferior turbinates were outfractured. Bilateral Echevarria splints were placed and secured with a 3-0 Prolene suture. The columellar incision was closed with simple interrupted 6-0 Prolene sutures. The marginal incision was closed with simple interrupted 4-0 chromic sutures. The head of bed was rotated back 180 degrees. The patient was allowed to awaken, extubated taken recovery in stable condition. I attest that I was present for and did the entire procedure myself. Estimated Blood Loss: 75 mL                    Complications: There were no complications.     Aamir Rogers MD

## 2021-10-28 ENCOUNTER — OFFICE VISIT (OUTPATIENT)
Dept: ENT CLINIC | Age: 41
End: 2021-10-28

## 2021-10-28 VITALS — HEIGHT: 63 IN | BODY MASS INDEX: 29.77 KG/M2 | WEIGHT: 168 LBS

## 2021-10-28 DIAGNOSIS — R09.81 NASAL CONGESTION: Primary | ICD-10-CM

## 2021-10-28 PROCEDURE — 99024 POSTOP FOLLOW-UP VISIT: CPT | Performed by: OTOLARYNGOLOGY

## 2021-10-28 RX ORDER — ONDANSETRON 4 MG/1
4 TABLET, FILM COATED ORAL 3 TIMES DAILY PRN
Qty: 15 TABLET | Refills: 0 | Status: SHIPPED | OUTPATIENT
Start: 2021-10-28 | End: 2022-02-14

## 2021-11-01 ENCOUNTER — OFFICE VISIT (OUTPATIENT)
Dept: ENT CLINIC | Age: 41
End: 2021-11-01

## 2021-11-01 ENCOUNTER — TELEPHONE (OUTPATIENT)
Dept: FAMILY MEDICINE CLINIC | Age: 41
End: 2021-11-01

## 2021-11-01 VITALS — WEIGHT: 166 LBS | BODY MASS INDEX: 29.41 KG/M2

## 2021-11-01 DIAGNOSIS — R09.81 NASAL CONGESTION: Primary | ICD-10-CM

## 2021-11-01 PROCEDURE — 99024 POSTOP FOLLOW-UP VISIT: CPT | Performed by: OTOLARYNGOLOGY

## 2021-11-01 NOTE — PROGRESS NOTES
Subjective:      Patient ID: Shahab York is a 37 y.o. male. HPI TELEHEALTH EVALUATION -- Audio/Visual (During YKXPA-17 public health emergency)     Pursuant to the emergency declaration under the [address] [place] [address] and the [employer] Act, this Virtual  Visit was conducted, with patient's consent, to reduce the patient's risk of exposure to COVID-19 and provide continuity of care for an established patient. Services were provided through a video synchronous discussion virtually to substitute for in-person clinic visit. Shahab York is a 37 y.o. male being evaluated by a Virtual Visit (video visit) encounter to address concerns as mentioned above. A caregiver was present when appropriate. Due to this being a TeleHealth encounter (During TISFV-61 public health emergency), evaluation of the following organ systems was limited: Vitals/Constitutional/EENT/Resp/CV/GI//MS/Neuro/Skin/Heme-Lymph-Imm. Pursuant to the emergency declaration under the [address] [place] [address] and the [employer] Act, this Virtual Visit was conducted with patient's (and/or legal guardian's) consent, to reduce the patient's risk of exposure to COVID-19 and provide necessary medical care. The patient (and/or legal guardian) has also been advised to contact this office for worsening conditions or problems, and seek emergency medical treatment and/or call 911 if deemed necessary. Services were provided through a video synchronous discussion virtually to substitute for in-person clinic visit. Patient and provider were located at their individual homes. --Michael Marshall DO on 11/1/2021 at 4:35 PM    An electronic signature was used to authenticate this note. Depression: Patient had profuse sweating with Prozac.   He was then prescribed Remeron 15 mg nightly. He stopped taking it as well. He feels that he is doing well without medication and denies any suicidal ideation.        Anxiety:  Patient takes Xanax 1 mg TID. [facility] feels that it works well to control his anxiety.   He does not always take it TID.       Review of Systems   Constitutional: Negative for chills and fever. Psychiatric/Behavioral: Negative for agitation, decreased concentration, dysphoric mood, sleep disturbance and suicidal ideas. The patient is nervous/anxious. There were no vitals taken for this visit. Objective:   Physical Exam  Constitutional:       General: He is not in acute distress. Appearance: Normal appearance. He is normal weight. He is not ill-appearing or toxic-appearing. HENT:      Head: Normocephalic. Pulmonary:      Effort: Pulmonary effort is normal.   Neurological:      Mental Status: He is alert and oriented to person, place, and time. Psychiatric:         Mood and Affect: Mood normal.         Behavior: Behavior normal.         Thought Content: Thought content normal.         Judgment: Judgment normal.         Assessment:      Depression   Anxiety       Plan:       OARRS report was reviewed  Medications refilled   RTO 3 months for Depression / Anxiety       Controlled Substance Monitoring:    Acute and Chronic Pain Monitoring:   RX Monitoring 11/1/2021   Attestation -   Periodic Controlled Substance Monitoring No signs of potential drug abuse or diversion identified.               [id number] DO

## 2021-11-01 NOTE — PROGRESS NOTES
Patient is following up from his septoplasty, valve repair and turbinate reduction that I did on October 26. He said he is feeling better now that he is not taking the pain medication. Exam  Nasal cast removed. Columellar sutures removed. Bilateral Echevarria splints removed. The patient's nasal dorsum was symmetric, but there was still quite a bit of edema. Afrin lidocaine were applied the bilateral nasal cavity. I debrided the bilateral nasal cavity with a Dodge suction. Incisions were healing well. Septum was nearly midline. Subjectively the patient felt as though he is breathing very well after removal splint    Plan  Patient appears to be recovering well from surgery. I told him to slowly start using his nose again over the next week. Plan on 1 month follow-up. Sooner if needed.

## 2021-11-01 NOTE — TELEPHONE ENCOUNTER
He is more than a month overdue for a visit. I need to see him before I can refill it. See if he can come in sooner than 11-8-21.

## 2021-11-02 ENCOUNTER — TELEMEDICINE (OUTPATIENT)
Dept: FAMILY MEDICINE CLINIC | Age: 41
End: 2021-11-02
Payer: COMMERCIAL

## 2021-11-02 DIAGNOSIS — F32.4 MAJOR DEPRESSION SINGLE EPISODE, IN PARTIAL REMISSION (HCC): Primary | ICD-10-CM

## 2021-11-02 DIAGNOSIS — F41.9 ANXIETY: ICD-10-CM

## 2021-11-02 PROCEDURE — 99213 OFFICE O/P EST LOW 20 MIN: CPT | Performed by: FAMILY MEDICINE

## 2021-11-02 RX ORDER — ALPRAZOLAM 1 MG/1
1 TABLET ORAL 3 TIMES DAILY PRN
Qty: 90 TABLET | Refills: 0 | Status: SHIPPED | OUTPATIENT
Start: 2021-11-02 | End: 2021-12-03

## 2021-12-01 ENCOUNTER — OFFICE VISIT (OUTPATIENT)
Dept: ENT CLINIC | Age: 41
End: 2021-12-01

## 2021-12-01 ENCOUNTER — TELEPHONE (OUTPATIENT)
Dept: ENT CLINIC | Age: 41
End: 2021-12-01

## 2021-12-01 VITALS — BODY MASS INDEX: 30.29 KG/M2 | WEIGHT: 171 LBS

## 2021-12-01 DIAGNOSIS — R09.81 NASAL CONGESTION: Primary | ICD-10-CM

## 2021-12-01 DIAGNOSIS — J34.89 NASAL VESTIBULITIS: ICD-10-CM

## 2021-12-01 PROCEDURE — 99024 POSTOP FOLLOW-UP VISIT: CPT | Performed by: OTOLARYNGOLOGY

## 2021-12-01 RX ORDER — MUPIROCIN CALCIUM 20 MG/G
CREAM TOPICAL
Qty: 1 EACH | Refills: 0 | Status: SHIPPED | OUTPATIENT
Start: 2021-12-01 | End: 2021-12-31

## 2021-12-01 NOTE — TELEPHONE ENCOUNTER
Pharmacy calling, ins will only cover mupirocin (BACTROBAN) 2 % OINTMENT not cream. If NOT ok to change to ointment, please contact pharmacy

## 2021-12-01 NOTE — PROGRESS NOTES
Patient is following up from his rhinoplasty and turbinate reduction that performed on October 26, 2021. Patient is very happy with his ability to breathe in and out of his nose. He said he still has a little bit of crusting anteriorly nasal cavity. Overall he has noticed a complete change in his nasal airway    Exam  Columellar incision is healed well. He has a small amount of grant crust anteriorly nasal cavity. Septum has a very mild residual leftward deviation. No nasal valve collapse. Turbinates are well reduced. Plan  Patient is recovering very well from surgery. He has a little bit of grant crusting in the nose which may just be from surgery or could be a low-grade nasal vestibulitis. I have given him Bactroban ointment for this. He can follow-up with me if there is any issues long-term.

## 2021-12-03 DIAGNOSIS — F41.9 ANXIETY: ICD-10-CM

## 2021-12-03 RX ORDER — ALPRAZOLAM 1 MG/1
1 TABLET ORAL 3 TIMES DAILY PRN
Qty: 90 TABLET | Refills: 0 | Status: SHIPPED | OUTPATIENT
Start: 2021-12-03 | End: 2022-01-05

## 2022-02-11 DIAGNOSIS — F41.9 ANXIETY: ICD-10-CM

## 2022-02-11 RX ORDER — ALPRAZOLAM 1 MG/1
1 TABLET ORAL 3 TIMES DAILY PRN
Qty: 90 TABLET | Refills: 0 | Status: SHIPPED | OUTPATIENT
Start: 2022-02-11 | End: 2022-03-13

## 2022-02-15 ENCOUNTER — VIRTUAL VISIT (OUTPATIENT)
Dept: FAMILY MEDICINE CLINIC | Age: 42
End: 2022-02-15
Payer: COMMERCIAL

## 2022-02-15 DIAGNOSIS — F32.4 MAJOR DEPRESSION SINGLE EPISODE, IN PARTIAL REMISSION (HCC): Primary | ICD-10-CM

## 2022-02-15 DIAGNOSIS — F41.9 ANXIETY: ICD-10-CM

## 2022-02-15 PROCEDURE — 99213 OFFICE O/P EST LOW 20 MIN: CPT | Performed by: FAMILY MEDICINE

## 2022-03-09 DIAGNOSIS — F41.9 ANXIETY: ICD-10-CM

## 2022-03-09 RX ORDER — ALPRAZOLAM 1 MG/1
1 TABLET ORAL 3 TIMES DAILY PRN
Qty: 90 TABLET | Refills: 0 | OUTPATIENT
Start: 2022-03-09 | End: 2022-04-08

## 2022-03-11 DIAGNOSIS — F41.9 ANXIETY: ICD-10-CM

## 2022-03-13 RX ORDER — ALPRAZOLAM 1 MG/1
1 TABLET ORAL 3 TIMES DAILY PRN
Qty: 90 TABLET | Refills: 0 | Status: SHIPPED | OUTPATIENT
Start: 2022-03-13 | End: 2022-04-11

## 2022-04-08 ENCOUNTER — TELEPHONE (OUTPATIENT)
Dept: FAMILY MEDICINE CLINIC | Age: 42
End: 2022-04-08

## 2022-04-08 NOTE — PROGRESS NOTES
Subjective:      Patient ID: Elaina Rios is a 37 y.o. male. HPI     Depression: Patient had profuse sweating with Prozac.  He was then prescribed Remeron 15 mg nightly.  He stopped taking it as well. [facility] feels that he is doing well without medication and denies any suicidal ideation.        Anxiety:  Patient takes Xanax 1 mg TID prn. Domo Carrasco feels that it works well to control his anxiety.   He does not always take it TID.     Review of Systems   Constitutional: Negative for chills and fever. /88   Ht 5' 3\" (1.6 m)   Wt 167 lb 12.8 oz (76.1 kg)   BMI 29.72 kg/m²    Objective:   Physical Exam  Vitals reviewed. Constitutional:       General: He is not in acute distress. Appearance: He is well-developed. HENT:      Head: Normocephalic. Right Ear: External ear normal.      Left Ear: External ear normal.   Neck:      Thyroid: No thyromegaly. Vascular: No carotid bruit or JVD. Cardiovascular:      Rate and Rhythm: Normal rate and regular rhythm. Heart sounds: Normal heart sounds. No murmur heard. Pulmonary:      Effort: Pulmonary effort is normal.      Breath sounds: Normal breath sounds. No wheezing or rales. Lymphadenopathy:      Cervical: No cervical adenopathy. Neurological:      Mental Status: He is alert and oriented to person, place, and time. Assessment:      Depression   Anxiety       Plan:      Chem 7, Lipid Panel, HgbA1C, Hepatitis C  OARRS report was reviewed  Medications refilled   I recommended the COVID vaccines   RTO 3 months for Depression / Anxiety       Controlled Substance Monitoring:    Acute and Chronic Pain Monitoring:   RX Monitoring 4/11/2022   Attestation -   Periodic Controlled Substance Monitoring No signs of potential drug abuse or diversion identified.                  [id number],

## 2022-04-08 NOTE — TELEPHONE ENCOUNTER
----- Message from [facility] sent at 4/8/2022  2:23 PM EDT -----  Subject: Referral Request    QUESTIONS   Reason for referral request? Patient has an appt in the office on 04/11 at   4:45. He wants to know if Dr. Gene Alicea wants him to have bloodwork done   for this. Call pt about bloodwork orders. Has the physician seen you for this condition before? No   Preferred Specialist (if applicable)? [facility]  Do you already have an appointment scheduled? No  Additional Information for Provider? If pt does not need blood work done   he said he would like to do this appt on 04/11 at 4:45 at a [facility]  ---------------------------------------------------------------------------  --------------  [employer] [address]  What is the best way for the office to contact you? OK to leave message on   voicemail  Preferred Call Back Phone Number? [phone]  ---------------------------------------------------------------------------  --------------  SCRIPT ANSWERS  Relationship to Patient?  Self

## 2022-04-11 ENCOUNTER — OFFICE VISIT (OUTPATIENT)
Dept: FAMILY MEDICINE CLINIC | Age: 42
End: 2022-04-11
Payer: COMMERCIAL

## 2022-04-11 VITALS
BODY MASS INDEX: 29.73 KG/M2 | DIASTOLIC BLOOD PRESSURE: 88 MMHG | SYSTOLIC BLOOD PRESSURE: 132 MMHG | WEIGHT: 167.8 LBS | HEIGHT: 63 IN

## 2022-04-11 DIAGNOSIS — Z13.1 SCREENING FOR DIABETES MELLITUS: ICD-10-CM

## 2022-04-11 DIAGNOSIS — F32.4 MAJOR DEPRESSION SINGLE EPISODE, IN PARTIAL REMISSION (HCC): Primary | ICD-10-CM

## 2022-04-11 DIAGNOSIS — Z11.59 NEED FOR HEPATITIS C SCREENING TEST: ICD-10-CM

## 2022-04-11 DIAGNOSIS — F41.9 ANXIETY: ICD-10-CM

## 2022-04-11 DIAGNOSIS — Z00.00 PREVENTATIVE HEALTH CARE: ICD-10-CM

## 2022-04-11 LAB
ANION GAP SERPL CALCULATED.3IONS-SCNC: 13 MMOL/L (ref 3–16)
BUN BLDV-MCNC: 9 MG/DL (ref 7–20)
CALCIUM SERPL-MCNC: 9.3 MG/DL (ref 8.3–10.6)
CHLORIDE BLD-SCNC: 104 MMOL/L (ref 99–110)
CHOLESTEROL, TOTAL: 170 MG/DL (ref 0–199)
CO2: 22 MMOL/L (ref 21–32)
CREAT SERPL-MCNC: 0.9 MG/DL (ref 0.9–1.3)
GFR AFRICAN AMERICAN: >60
GFR NON-AFRICAN AMERICAN: >60
GLUCOSE BLD-MCNC: 90 MG/DL (ref 70–99)
HDLC SERPL-MCNC: 51 MG/DL (ref 40–60)
HEPATITIS C ANTIBODY INTERPRETATION: NORMAL
LDL CHOLESTEROL CALCULATED: 85 MG/DL
POTASSIUM SERPL-SCNC: 4.2 MMOL/L (ref 3.5–5.1)
SODIUM BLD-SCNC: 139 MMOL/L (ref 136–145)
TRIGL SERPL-MCNC: 172 MG/DL (ref 0–150)
VLDLC SERPL CALC-MCNC: 34 MG/DL

## 2022-04-11 PROCEDURE — 99213 OFFICE O/P EST LOW 20 MIN: CPT | Performed by: FAMILY MEDICINE

## 2022-04-11 RX ORDER — ALPRAZOLAM 1 MG/1
1 TABLET ORAL 3 TIMES DAILY PRN
Qty: 90 TABLET | Refills: 0 | Status: SHIPPED | OUTPATIENT
Start: 2022-04-11 | End: 2022-05-09

## 2022-04-11 SDOH — ECONOMIC STABILITY: FOOD INSECURITY: WITHIN THE PAST 12 MONTHS, THE FOOD YOU BOUGHT JUST DIDN'T LAST AND YOU DIDN'T HAVE MONEY TO GET MORE.: NEVER TRUE

## 2022-04-11 SDOH — ECONOMIC STABILITY: FOOD INSECURITY: WITHIN THE PAST 12 MONTHS, YOU WORRIED THAT YOUR FOOD WOULD RUN OUT BEFORE YOU GOT MONEY TO BUY MORE.: NEVER TRUE

## 2022-04-11 ASSESSMENT — PATIENT HEALTH QUESTIONNAIRE - PHQ9
10. IF YOU CHECKED OFF ANY PROBLEMS, HOW DIFFICULT HAVE THESE PROBLEMS MADE IT FOR YOU TO DO YOUR WORK, TAKE CARE OF THINGS AT HOME, OR GET ALONG WITH OTHER PEOPLE: 1
8. MOVING OR SPEAKING SO SLOWLY THAT OTHER PEOPLE COULD HAVE NOTICED. OR THE OPPOSITE, BEING SO FIGETY OR RESTLESS THAT YOU HAVE BEEN MOVING AROUND A LOT MORE THAN USUAL: 0
5. POOR APPETITE OR OVEREATING: 0
9. THOUGHTS THAT YOU WOULD BE BETTER OFF DEAD, OR OF HURTING YOURSELF: 0
7. TROUBLE CONCENTRATING ON THINGS, SUCH AS READING THE NEWSPAPER OR WATCHING TELEVISION: 0
2. FEELING DOWN, DEPRESSED OR HOPELESS: 0
SUM OF ALL RESPONSES TO PHQ QUESTIONS 1-9: 2
SUM OF ALL RESPONSES TO PHQ QUESTIONS 1-9: 2
1. LITTLE INTEREST OR PLEASURE IN DOING THINGS: 0
3. TROUBLE FALLING OR STAYING ASLEEP: 1
SUM OF ALL RESPONSES TO PHQ9 QUESTIONS 1 & 2: 0
SUM OF ALL RESPONSES TO PHQ QUESTIONS 1-9: 2
6. FEELING BAD ABOUT YOURSELF - OR THAT YOU ARE A FAILURE OR HAVE LET YOURSELF OR YOUR FAMILY DOWN: 0
SUM OF ALL RESPONSES TO PHQ QUESTIONS 1-9: 2
4. FEELING TIRED OR HAVING LITTLE ENERGY: 1

## 2022-04-11 ASSESSMENT — SOCIAL DETERMINANTS OF HEALTH (SDOH): HOW HARD IS IT FOR YOU TO PAY FOR THE VERY BASICS LIKE FOOD, HOUSING, MEDICAL CARE, AND HEATING?: NOT HARD AT ALL

## 2022-04-12 LAB
ESTIMATED AVERAGE GLUCOSE: 105.4 MG/DL
HBA1C MFR BLD: 5.3 %

## 2022-05-09 DIAGNOSIS — F41.9 ANXIETY: ICD-10-CM

## 2022-05-09 RX ORDER — ALPRAZOLAM 1 MG/1
1 TABLET ORAL 3 TIMES DAILY PRN
Qty: 90 TABLET | Refills: 0 | Status: SHIPPED | OUTPATIENT
Start: 2022-05-09 | End: 2022-06-13

## 2022-06-13 ENCOUNTER — TELEPHONE (OUTPATIENT)
Dept: FAMILY MEDICINE CLINIC | Age: 42
End: 2022-06-13

## 2022-06-13 DIAGNOSIS — F41.9 ANXIETY: ICD-10-CM

## 2022-06-13 RX ORDER — ALPRAZOLAM 1 MG/1
1 TABLET ORAL 3 TIMES DAILY PRN
Qty: 90 TABLET | Refills: 0 | Status: SHIPPED | OUTPATIENT
Start: 2022-06-13 | End: 2022-07-19 | Stop reason: SDUPTHER

## 2022-06-13 NOTE — TELEPHONE ENCOUNTER
----- Message from Tahira Cohen DO sent at 6/13/2022  7:55 AM EDT -----  He has a 15 minute appointment tomorrow for \"DOT medication paperwork\". Please make sure that this is not actually a DOT physical.  If it is, I would need a 30 minute appointment.

## 2022-06-13 NOTE — TELEPHONE ENCOUNTER
Call placed to patient. Patient stated he has already had the DOT physical about 6 months ago. Patient stated he just needs the forms filled out regarding his medications. Is it okay for patient to keep 15 minute appointment slot?

## 2022-06-13 NOTE — TELEPHONE ENCOUNTER
Informed patient while on the phone initially that I would only call back if patient needed to reschedule. If he did not hear from me to plan on coming in to tomorrow appointment.

## 2022-07-11 DIAGNOSIS — F41.9 ANXIETY: ICD-10-CM

## 2022-07-11 RX ORDER — ALPRAZOLAM 1 MG/1
1 TABLET ORAL 3 TIMES DAILY PRN
Qty: 90 TABLET | Refills: 0 | OUTPATIENT
Start: 2022-07-11 | End: 2022-08-10

## 2022-07-17 NOTE — PROGRESS NOTES
Health Maintenance Summary     Topic Due On Due Status Completed On Postpone Until Reason    Colorectal Cancer Screening - Colonoscopy Jun 16, 2021 Not Due Jun 16, 2016      Immunization - Td/Tdap Jul 11, 2019 Not Due Jul 11, 2009      Immunization-Zoster Nov 17, 2003 Postponed  May 19, 2017 Patient Refused    Immunization - Pneumococcal Nov 17, 2008 Overdue       Medicare Wellness Visit Nov 17, 2008 Postponed  May 19, 2017 Not Clinically Indicated    Immunization-Influenza  Completed Oct 21, 2016            Patient is due for topics as listed above, he wishes to decline at this time .  Patient states he is pretty sure he received the pneumonia vaccine and booster but it is not in his records.     Substance Monitoring No signs of potential drug abuse or diversion identified.                       [id number], DO

## 2022-07-19 ENCOUNTER — OFFICE VISIT (OUTPATIENT)
Dept: FAMILY MEDICINE CLINIC | Age: 42
End: 2022-07-19

## 2022-07-19 VITALS
BODY MASS INDEX: 30.7 KG/M2 | DIASTOLIC BLOOD PRESSURE: 88 MMHG | HEIGHT: 61 IN | SYSTOLIC BLOOD PRESSURE: 118 MMHG | WEIGHT: 162.6 LBS

## 2022-07-19 DIAGNOSIS — R40.0 DAYTIME SOMNOLENCE: ICD-10-CM

## 2022-07-19 DIAGNOSIS — F41.9 ANXIETY: ICD-10-CM

## 2022-07-19 DIAGNOSIS — F32.4 MAJOR DEPRESSION SINGLE EPISODE, IN PARTIAL REMISSION (HCC): Primary | ICD-10-CM

## 2022-07-19 DIAGNOSIS — F51.01 PRIMARY INSOMNIA: ICD-10-CM

## 2022-07-19 PROCEDURE — 99213 OFFICE O/P EST LOW 20 MIN: CPT | Performed by: FAMILY MEDICINE

## 2022-07-19 RX ORDER — ALPRAZOLAM 1 MG/1
1 TABLET ORAL 3 TIMES DAILY PRN
Qty: 90 TABLET | Refills: 0 | Status: SHIPPED | OUTPATIENT
Start: 2022-07-19 | End: 2022-08-22

## 2022-07-19 RX ORDER — TRAZODONE HYDROCHLORIDE 150 MG/1
150 TABLET ORAL NIGHTLY
Qty: 30 TABLET | Refills: 5 | Status: SHIPPED | OUTPATIENT
Start: 2022-07-19

## 2022-08-16 ENCOUNTER — OFFICE VISIT (OUTPATIENT)
Dept: SLEEP MEDICINE | Age: 42
End: 2022-08-16

## 2022-08-16 VITALS
WEIGHT: 167 LBS | RESPIRATION RATE: 18 BRPM | BODY MASS INDEX: 30.73 KG/M2 | DIASTOLIC BLOOD PRESSURE: 70 MMHG | SYSTOLIC BLOOD PRESSURE: 105 MMHG | TEMPERATURE: 97.7 F | OXYGEN SATURATION: 98 % | HEIGHT: 62 IN | HEART RATE: 65 BPM

## 2022-08-16 DIAGNOSIS — G47.19 EXCESSIVE DAYTIME SLEEPINESS: ICD-10-CM

## 2022-08-16 DIAGNOSIS — E66.9 OBESITY (BMI 30.0-34.9): ICD-10-CM

## 2022-08-16 DIAGNOSIS — R06.81 WITNESSED EPISODE OF APNEA: ICD-10-CM

## 2022-08-16 DIAGNOSIS — G47.26 SHIFTING SLEEP-WORK SCHEDULE: ICD-10-CM

## 2022-08-16 DIAGNOSIS — R06.83 SNORING: ICD-10-CM

## 2022-08-16 DIAGNOSIS — G47.33 OSA (OBSTRUCTIVE SLEEP APNEA): Primary | ICD-10-CM

## 2022-08-16 DIAGNOSIS — R06.89 GASPING FOR BREATH: ICD-10-CM

## 2022-08-16 PROCEDURE — 99204 OFFICE O/P NEW MOD 45 MIN: CPT | Performed by: PSYCHIATRY & NEUROLOGY

## 2022-08-16 ASSESSMENT — SLEEP AND FATIGUE QUESTIONNAIRES
HOW LIKELY ARE YOU TO NOD OFF OR FALL ASLEEP IN A CAR, WHILE STOPPED FOR A FEW MINUTES IN TRAFFIC: 0
HOW LIKELY ARE YOU TO NOD OFF OR FALL ASLEEP WHILE SITTING INACTIVE IN A PUBLIC PLACE: 2
HOW LIKELY ARE YOU TO NOD OFF OR FALL ASLEEP WHILE WATCHING TV: 2
NECK CIRCUMFERENCE (INCHES): 16
ESS TOTAL SCORE: 12
HOW LIKELY ARE YOU TO NOD OFF OR FALL ASLEEP WHILE SITTING QUIETLY AFTER LUNCH WITHOUT ALCOHOL: 2
HOW LIKELY ARE YOU TO NOD OFF OR FALL ASLEEP WHILE SITTING AND TALKING TO SOMEONE: 0
HOW LIKELY ARE YOU TO NOD OFF OR FALL ASLEEP WHILE SITTING AND READING: 2
HOW LIKELY ARE YOU TO NOD OFF OR FALL ASLEEP WHEN YOU ARE A PASSENGER IN A CAR FOR AN HOUR WITHOUT A BREAK: 2
HOW LIKELY ARE YOU TO NOD OFF OR FALL ASLEEP WHILE LYING DOWN TO REST IN THE AFTERNOON WHEN CIRCUMSTANCES PERMIT: 2

## 2022-08-16 ASSESSMENT — ENCOUNTER SYMPTOMS
ALLERGIC/IMMUNOLOGIC NEGATIVE: 1
EYES NEGATIVE: 1
GASTROINTESTINAL NEGATIVE: 1
RESPIRATORY NEGATIVE: 1

## 2022-08-16 NOTE — PROGRESS NOTES
MD NELIA Montilla Board Certified in Sleep Medicine  Certified [facility] Sleep Medicine  Board Certified in Neurology [address] [facility]  1000 [facility] [address] [address] [place] [address] ([address]  [address], [address]           [address] [facility] SLEEP MEDICINE [address] [address] [phone]    Subjective:     Patient ID: Herb Biswas is a 37 y.o. male. Chief Complaint   Patient presents with    Establish Care    Daytime Sleepiness       HPI:        Herb Biswas is a 37 y.o. male referred by Dr. Doug Guzman for a sleep evaluation. He complains of snoring, snorting, choking, periods of not breathing, tossing and turning, excessive daytime sleepiness, feels sleepy during the day, take naps during the day, teeth grinding but he denies knees buckling with laughing, completely or partially paralyzed while falling asleep or waking up, noisy environment, uncomfortable room temperature, uncomfortable bedding. Symptoms began several years ago, gradually worsening since that time. The patient's bed-partner confirmed the snoring and stopped breathing at night. Works for [employer] and swing shift. Currently working third shift  SLEEP SCHEDULE: Goes to bed around 4:30-5 AM in the weekdays and 12 AM in the weekends. It usually takes the patient 15-20 minutes to fall asleep. The patient gets up 2-3 per night to go to the bathroom. The Patient finally gets up at 6 AM-1 AM during the weekdays and 8 AM in the weekends. patient wakes up with dry mouth . The patient has restless sleep with frequent arousals in addition to the Patient has significant daytime sleepiness.  The Patient scored Total score: 12 on [place] Sleepiness Scale ( more than 10 is indicative of daytime sleepiness)and 44 in fatigue scale ( more than 36 is indicative of daytime fatigue). The patient takes few naps a week  60 minutes and usually is not refreshing nap. Previous evaluation and treatment has included- none. The Patient has been obese for many years and tried, has gained 25 in the last 5 years,  unsuccessfully to lose weight through diet, exercise. 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 FAA/'slicense - N/A      Previous Report(s) Reviewed: historical medical records       Social History     Socioeconomic History    Marital status:      Spouse name: Not on file    Number of children: 1    Years of education: Not on file    Highest education level: Not on file   Occupational History    Occupation: Construction   Tobacco Use    Smoking status: Former     Packs/day: 1.00     Years: 15.00     Pack years: 15.00     Types: Cigarettes     Quit date: 2010     Years since quittin.0     Passive exposure: Past    Smokeless tobacco: Current     Types: Chew    Tobacco comments:     discussed oral cancer risks    Vaping Use    Vaping Use: Never used   Substance and Sexual Activity    Alcohol use: Yes     Comment: Occasional    Drug use: No    Sexual activity: Not on file   Other Topics Concern    Not on file   Social History Narrative    Caffeine: SODA - 0 TEA - 2-3 glasses a day   COFFEE - 0     Social Determinants of Health     Financial Resource Strain: Low Risk     Difficulty of Paying Living Expenses: Not hard at all   Food Insecurity: No Food Insecurity    Worried About Running Out of Food in the Last Year: Never true    Ran Out of Food in the Last Year: Never true   Transportation Needs: Not on file   Physical Activity: Not on file   Stress: Not on file   Social Connections: Not on file   Intimate Partner Violence: Not on file   Housing Stability: Not on file       Prior to Admission medications    Medication Sig Start Date End Date Taking?  Authorizing Provider   traZODone (DESYREL) 150 MG tablet Take 1 tablet by mouth nightly 7/19/22  Yes Pauline Meigs Niehauser, DO   ALPRAZolam (XANAX) 1 MG tablet Take 1 tablet by mouth 3 times daily as needed for Anxiety for up to 30 days. 7/19/22 8/18/22 Yes Pauline Meigs Niehauser, DO       Allergies as of 08/16/2022 - Fully Reviewed 08/16/2022   Allergen Reaction Noted    Prozac [fluoxetine hcl]  11/16/2020       Patient Active Problem List   Diagnosis    Anxiety    Degeneration of lumbar intervertebral disc    Major depression single episode, in partial remission [facility])    Former smoker    Sprain of right ankle    Lumbar disc herniation    Primary insomnia       Past Medical History:   Diagnosis Date    Anxiety     Degeneration of lumbar intervertebral disc     Workman's Comp / Dr. Arriaga [place]    Former smoker     Lumbar disc herniation 04/27/2018    Left L5-S1 on MRI    Major depression single episode, in partial remission ([facility] [address]     Primary insomnia        Past Surgical History:   Procedure Laterality Date    NOSE SURGERY Bilateral 10/26/2021    SEPTOPLASTY, BILATERAL NASAL VALVE REPAIR performed by Natalee Brambila MD at [facility] 88 N/A 10/26/2021    INFERIOR TURBINATE REDUCTION performed by Natalee Brambila MD at [facility] 16         Family History   Problem Relation Age of Onset    Depression Mother     Depression Father     High Blood Pressure Father     Alcohol Abuse Father     Glaucoma Maternal Uncle     Alcohol Abuse Maternal Uncle     Alcohol Abuse Maternal Grandmother     Bipolar Disorder Maternal Grandmother     Heart Attack Maternal Grandfather     Heart Attack Paternal Grandfather        Review of Systems   Constitutional:  Positive for fatigue. HENT: Negative. Eyes: Negative. Respiratory: Negative. Cardiovascular: Negative. Negative for leg swelling. Gastrointestinal: Negative. Endocrine: Positive for heat intolerance. Genitourinary:  Positive for frequency. Musculoskeletal: Negative. Skin: Negative. Allergic/Immunologic: Negative. Neurological: Negative. Hematological: Negative. Psychiatric/Behavioral:  Positive for agitation, dysphoric mood and sleep disturbance. The patient is nervous/anxious. Objective:     Vitals:  Weight BMI Neck circumference    Wt Readings from Last 3 Encounters:   08/16/22 167 lb (75.8 kg)   07/19/22 162 lb 9.6 oz (73.8 kg)   04/11/22 167 lb 12.8 oz (76.1 kg)    Body mass index is 30.54 kg/m². Neck circumference (Inches): 16     BP HR SaO2   BP Readings from Last 3 Encounters:   08/16/22 105/70   07/19/22 118/88   04/11/22 132/88    Pulse Readings from Last 3 Encounters:   08/16/22 65   10/26/21 96   10/06/21 75    SpO2 Readings from Last 3 Encounters:   08/16/22 98%   10/26/21 98%   10/26/21 95%        The mandibular molar Class :   []1 []2 []3      Mallampati I Airway Classification:   []1 []2 []3 []4        Physical Exam  Vitals and nursing note reviewed. Constitutional:       Appearance: Normal appearance. HENT:      Head: Atraumatic. Nose: Nose normal.      Mouth/Throat:      Comments: Mallampati class 2, no retrognathia or hypognathia , normal airflow in bilateral nostrils, no septum deviation , crowded oropharynx with low soft palate, no tonsils enlargement. Eyes:      Extraocular Movements: Extraocular movements intact. Cardiovascular:      Rate and Rhythm: Normal rate and regular rhythm. Pulmonary:      Effort: Pulmonary effort is normal.      Breath sounds: Normal breath sounds. Musculoskeletal:      Cervical back: Neck supple. Right lower leg: No edema. Left lower leg: No edema. Neurological:      Mental Status: Mental status is at baseline. Psychiatric:         Mood and Affect: Mood normal.       Assessment:   Obstructive sleep apnea especially with snoring, snorting,  observed apnea, daytime sleepiness, Mallampati class of 2 and obesity. DOT/Rail road. Diagnosis Orders   1.  ISSA (obstructive sleep apnea)  Home Sleep Study      2. Excessive daytime sleepiness        3. Obesity (BMI 30.0-34.9)        4. Snoring        5. Witnessed episode of apnea        6. Gasping for breath        7. Shifting sleep-work schedule          Plan:   Ideal he should keep consistent bed and wake up time, the plan to improve his quality of sleep bu treating the suspected ISSA, and if he still has EDS during his shift I will consider Nuvigil. Patient was counseled about the pathophysiology of obstructive sleep apnea syndrome and the methods for evaluating its presence and severity. Patient was counseled to avoid driving and other potentially hazardous circumstances if the patient is experiencing excessive sleepiness. Treatment considerations include the use of nasal CPAP,  In the meantime, the patient should be cautioned to avoid the use of alcohol or other depressant medications because of potential for increasing the duration and severity of apnea and cautioned regarding driving or operating and dangerous equipment if the patient is experiencing daytime sleepiness. .  Weight loss: We discussed the proportionality between weight and AHI. With 10% weight change, the AHI has a 27% proportionate change. With 20% weight change, the AHI has a 45-50% proportionate change. Orders Placed This Encounter   Procedures    Home Sleep Study         Return for F/U between 31 and 90 days from the recieving CPAP.     Jessie Ashley MD  Medical Director - [facility]

## 2022-08-16 NOTE — PATIENT INSTRUCTIONS
Orders Placed This Encounter   Procedures    Home Sleep Study     Standing Status:   Future     Standing Expiration Date:   8/16/2023     Order Specific Question:   Location For Sleep Study     Answer:   [place]     Order Specific Question:   Select Sleep Lab Location     Answer:   [facility]

## 2022-08-22 DIAGNOSIS — F41.9 ANXIETY: ICD-10-CM

## 2022-08-22 RX ORDER — ALPRAZOLAM 1 MG/1
1 TABLET ORAL 3 TIMES DAILY PRN
Qty: 90 TABLET | Refills: 0 | Status: SHIPPED | OUTPATIENT
Start: 2022-08-22 | End: 2022-09-25

## 2022-09-24 DIAGNOSIS — F41.9 ANXIETY: ICD-10-CM

## 2022-09-25 RX ORDER — ALPRAZOLAM 1 MG/1
1 TABLET ORAL 3 TIMES DAILY PRN
Qty: 90 TABLET | Refills: 0 | Status: SHIPPED | OUTPATIENT
Start: 2022-09-25 | End: 2022-10-26 | Stop reason: SDUPTHER

## 2022-09-30 ENCOUNTER — HOSPITAL ENCOUNTER (OUTPATIENT)
Dept: SLEEP CENTER | Age: 42
Discharge: HOME | End: 2022-09-30
Payer: COMMERCIAL

## 2022-09-30 DIAGNOSIS — G47.33 OSA (OBSTRUCTIVE SLEEP APNEA): ICD-10-CM

## 2022-09-30 PROCEDURE — 95806 SLEEP STUDY UNATT&RESP EFFT: CPT

## 2022-10-03 PROBLEM — G47.33 OSA (OBSTRUCTIVE SLEEP APNEA): Status: ACTIVE | Noted: 2022-10-03

## 2022-10-03 PROCEDURE — 95806 SLEEP STUDY UNATT&RESP EFFT: CPT | Performed by: PSYCHIATRY & NEUROLOGY

## 2022-10-03 NOTE — PROGRESS NOTES
Soni Marr         : 1980  [] [address]     [] [address]      [] Silvia     []Renzo    [] Abi Vickers  [] [facility]  [] [facility]   [] Other:  Diagnosis: [x] ISSA (G47.33) [] CSA (G47.31) [] Apnea (G47.30)   Length of Need: [] 12 Months [x] 99 Months [] Other:    Machine (RADHA!):  [x] ResMed AirSense     Auto [] Other:     [x]  CPAP () [] Bilevel ()   Mode: [x] Auto [] Spontaneous    Mode: [] Auto [] Spontaneous           Between 5 and 15 cm                 Comfort Settings:   - Ramp Pressure: 5 cmH2O                                        - Ramp time: 15 min                                     -  Flex/EPR - 3 full time                                    - For ResMed Bilevel (TiMax-4 sec   TiMin- 0.2 sec)     Humidifier: [x] Heated ()        [x] Water chamber replacement ()/ 1 per 6 months        Mask:  Please always start with the mask the patient used during the titraion   [x] Nasal () /1 per 3 months [x] Full Face () /1 per 3 months   [x] Patient choice -Size and fit mask [x] Patient Choice - Size and fit mask   [] Dispense:  [] Dispense:    [x] Headgear () / 1 per 3 months [x] Headgear () / 1 per 3 months   [x] Replacement Nasal Cushion ()/2 per month [x] Interface Replacement ()/1 per month   [x] Replacement Nasal Pillows ()/2 per month         Tubing: [x] Heated ()/1 per 3 months    [] Standard ()/1 per 3 months [] Other:           Filters: [x] Non-disposable ()/1 per 6 months     [x] Ultra-Fine, Disposable ()/2 per month        Miscellaneous: [x] Chin Strap ()/ 1 per 6 months [] O2 bleed-in:       LPM   [] Oximetry on CPAP/Bilevel []  Other:          Start Order Date: 10/03/22    MEDICAL JUSTIFICATION:  I, the undersigned, certify that the above prescribed supplies are medically necessary for this patients wellbeing.   In my opinion, the supplies are both reasonable and necessary in reference to accepted standards of medicalpractice in treatment of this patients condition.     Sybil Stark MD      NPI: [id number]       Order Signed Date: 10/03/22    Electronically signed by Sybil Stark MD on 10/3/2022 at 1:28 PM

## 2022-10-04 ENCOUNTER — TELEPHONE (OUTPATIENT)
Dept: PULMONOLOGY | Age: 42
End: 2022-10-04

## 2022-10-04 PROBLEM — G47.33 OBSTRUCTIVE SLEEP APNEA: Status: ACTIVE | Noted: 2022-10-01

## 2022-10-04 NOTE — TELEPHONE ENCOUNTER
HOME Sleep study showed moderate ISSA. AHI was 18.3  per hr. And O2 Desaturations to 85%. Dr Susan Celaya: Follow up with the patient's sleep physician to discuss results  APAP between 5 and 15   Avoid sedatives, alcohol and caffeinated drinks at bedtime. Avoid driving while sleepy.      LMOM to call     NEED DME    Follow up in office 31-90 days

## 2022-10-05 NOTE — TELEPHONE ENCOUNTER
Patient chooses [facility] for his DME. Please fax his APAP order to [facility]. Thank you.

## 2022-10-25 DIAGNOSIS — F41.9 ANXIETY: ICD-10-CM

## 2022-10-25 RX ORDER — FLUOXETINE HYDROCHLORIDE 20 MG/1
CAPSULE ORAL
Qty: 63 CAPSULE | Refills: 2 | Status: SHIPPED | OUTPATIENT
Start: 2022-10-25 | End: 2022-12-22

## 2022-10-25 RX ORDER — ALPRAZOLAM 1 MG/1
1 TABLET ORAL 3 TIMES DAILY PRN
Qty: 90 TABLET | Refills: 0 | OUTPATIENT
Start: 2022-10-25 | End: 2022-11-24

## 2022-10-25 NOTE — TELEPHONE ENCOUNTER
Last office visit 7/19/2022     Last written      Next office visit scheduled Visit date not found    Requested Prescriptions     Pending Prescriptions Disp Refills    ALPRAZolam (XANAX) 1 MG tablet [Pharmacy Med Name: ALPRAZOLAM 1 MG TABLET] 90 tablet 0     Sig: TAKE 1 TABLET BY MOUTH 3 TIMES DAILY AS NEEDED FOR ANXIETY FOR UP TO 30 DAYS.     FLUoxetine (PROZAC) 20 MG capsule [Pharmacy Med Name: FLUOXETINE HCL 20 MG CAPSULE] 63 capsule 8     Sig: TAKE 3 CAPSULES BY MOUTH EVERY DAY

## 2022-10-26 ENCOUNTER — TELEMEDICINE (OUTPATIENT)
Dept: FAMILY MEDICINE CLINIC | Age: 42
End: 2022-10-26
Payer: COMMERCIAL

## 2022-10-26 DIAGNOSIS — F32.4 MAJOR DEPRESSION SINGLE EPISODE, IN PARTIAL REMISSION (HCC): Primary | ICD-10-CM

## 2022-10-26 DIAGNOSIS — F41.9 ANXIETY: ICD-10-CM

## 2022-10-26 DIAGNOSIS — G47.33 OBSTRUCTIVE SLEEP APNEA: ICD-10-CM

## 2022-10-26 DIAGNOSIS — Z23 NEED FOR INFLUENZA VACCINATION: ICD-10-CM

## 2022-10-26 DIAGNOSIS — F51.01 PRIMARY INSOMNIA: ICD-10-CM

## 2022-10-26 PROCEDURE — 99213 OFFICE O/P EST LOW 20 MIN: CPT | Performed by: FAMILY MEDICINE

## 2022-10-26 RX ORDER — ALPRAZOLAM 1 MG/1
1 TABLET ORAL 3 TIMES DAILY PRN
Qty: 90 TABLET | Refills: 0 | Status: SHIPPED | OUTPATIENT
Start: 2022-10-26 | End: 2022-11-25

## 2022-10-26 NOTE — PROGRESS NOTES
Subjective:      Patient ID: Nadege Acevedo is a 37 y.o. male. HPI  Nadege Acevedo, was evaluated through a synchronous (real-time) audio-video encounter. The patient (or guardian if applicable) is aware that this is a billable service, which includes applicable co-pays. This Virtual Visit was conducted with patient's (and/or legal guardian's) consent. The visit was conducted pursuant to the emergency declaration under the [address] [place] [address], [address] [place] [address] authority and the [employer] Act. Patient identification was verified, and a caregiver was present when appropriate. The patient was located in a state where the provider was licensed to provide care. Total time spent for this encounter: Not billed by time    --Nilam Ho DO on 10/26/2022 at 10:16 AM    An electronic signature was used to authenticate this note. Depression: Patient is tolerating and compliant with Prozac 60 mg daily and Trazodone 150 mg nightly. He feels that the medication is helping with his symptoms. He is sleeping well and denies any suicidal ideation. He feels that he still needs to be on the medication. Anxiety:  Patient takes Xanax 1 mg TID prn. He feels that it works well to control his anxiety. He does not always take it TID. Insomnia:  Patient takes Trazodone 150 mg nightly and feels that it works well to get him to sleep. Sleep Apnea:  Patient was given a sleep referral at his visit on 7-19-22. He did a sleep test and was told that he has sleep apnea and will follow up for the results. Review of Systems   Constitutional:  Negative for chills and fever. Psychiatric/Behavioral:  Positive for sleep disturbance. Negative for agitation, decreased concentration, dysphoric mood and suicidal ideas. The patient is nervous/anxious. There were no vitals taken for this visit.    Objective:   Physical Exam  Constitutional:       General: He is not in acute distress. Appearance: Normal appearance. He is normal weight. He is not ill-appearing or toxic-appearing. HENT:      Head: Normocephalic. Pulmonary:      Effort: Pulmonary effort is normal.   Neurological:      Mental Status: He is alert and oriented to person, place, and time. Psychiatric:         Mood and Affect: Mood normal.         Behavior: Behavior normal.         Thought Content:  Thought content normal.         Judgment: Judgment normal.       Assessment:      Depression   Anxiety   Insomnia   Sleep Apnea       Plan:      Refilled medication  Follow up with Dr. Jered Yadav for CPAP  I recommended a flu shot  RTO 3 months for Depression / Anxiety / Tanya 1878, DO

## 2022-12-05 ENCOUNTER — TELEPHONE (OUTPATIENT)
Dept: PULMONOLOGY | Age: 42
End: 2022-12-05

## 2022-12-05 DIAGNOSIS — F41.9 ANXIETY: ICD-10-CM

## 2022-12-05 RX ORDER — ALPRAZOLAM 1 MG/1
1 TABLET ORAL 3 TIMES DAILY PRN
Qty: 90 TABLET | Refills: 0 | Status: SHIPPED | OUTPATIENT
Start: 2022-12-05 | End: 2023-01-04

## 2022-12-05 NOTE — TELEPHONE ENCOUNTER
Joel Mares called the office back and I gave him the information. Raquel Cole and let him know. Joel Mares said he will call [address] [place] [address] and take care of it.

## 2022-12-05 NOTE — TELEPHONE ENCOUNTER
Arcelia Simpson called in wanting to switch DME companies. Asked him why. He had a bad phone call with [address]. I called [employer] and called Arcelia Simpson back but he didn't answer. Please advise Arcelia Simpson that all [address] needs is a credit card number on file. They won't bill it right away Zuly Carter says, and as soon as they have it on file they will ship his machine straight to him if that's what he would like. Zuly Carter says they notify them way before the charges are processed. Arcelia Simpson has a $500 deductible, so his first 3 payments would be something like month #1 $30.11, month #2 0.71, and 3 $53.55.  Lizet Comment we could send his order somewhere else, but [address]  already has his machine so I would just stick with [address]

## 2022-12-22 RX ORDER — FLUOXETINE HYDROCHLORIDE 20 MG/1
CAPSULE ORAL
Qty: 63 CAPSULE | Refills: 0 | Status: SHIPPED | OUTPATIENT
Start: 2022-12-22 | End: 2023-01-03 | Stop reason: SDUPTHER

## 2022-12-29 NOTE — PROGRESS NOTES
Subjective:      Patient ID: Harshad Bergman is a 40 y.o. male. HPI  Harshad Bergman, was evaluated through a synchronous (real-time) audio-video encounter. The patient (or guardian if applicable) is aware that this is a billable service, which includes applicable co-pays. This Virtual Visit was conducted with patient's (and/or legal guardian's) consent. The visit was conducted pursuant to the emergency declaration under the [address] [place] [address], [address] [place] [address] authority and the [employer] Act. Patient identification was verified, and a caregiver was present when appropriate. The patient was located in a state where the provider was licensed to provide care. Total time spent for this encounter: Not billed by time    --Edward Parrish DO on 12/29/2022 at 12:18 PM    An electronic signature was used to authenticate this note. Depression: Patient is tolerating and compliant with Prozac 60 mg daily and Trazodone 150 mg nightly. He feels that the medication is helping with his symptoms. He is sleeping well and denies any suicidal ideation. He feels that he still needs to be on the medication. He still complains of poor motivation and fatigue. Anxiety:  Patient takes Xanax 1 mg TID prn. He feels that it works well to control his anxiety. He does not always take it TID. Insomnia:  Patient takes Trazodone 150 mg nightly and feels that it works well to get him to sleep. Review of Systems   Constitutional:  Negative for chills and fever. Psychiatric/Behavioral:  Positive for dysphoric mood and sleep disturbance. Negative for agitation, decreased concentration and suicidal ideas. The patient is nervous/anxious. BP Readings from Last 3 Encounters:   08/16/22 105/70   07/19/22 118/88   04/11/22 132/88        There were no vitals taken for this visit.    Objective:   Physical Exam  Constitutional: General: He is not in acute distress. Appearance: Normal appearance. He is not ill-appearing or toxic-appearing. HENT:      Head: Normocephalic. Pulmonary:      Effort: Pulmonary effort is normal.   Neurological:      Mental Status: He is alert and oriented to person, place, and time. Psychiatric:         Mood and Affect: Mood normal.         Behavior: Behavior normal.         Thought Content: Thought content normal.         Judgment: Judgment normal.       Assessment:      Depression  Anxiety  Insomnia       Plan:      Rx Wellbutrin  mg once daily. OARRS report was reviewed  Medications refilled   I recommended a flu shot. RTO 3 months for Depression / Anxiety / Insomnia / Sleep Apnea       Controlled Substance Monitoring:    Acute and Chronic Pain Monitoring:   RX Monitoring 1/3/2023   Attestation -   Periodic Controlled Substance Monitoring No signs of potential drug abuse or diversion identified.               [address] [place], DO

## 2023-01-03 ENCOUNTER — TELEMEDICINE (OUTPATIENT)
Dept: FAMILY MEDICINE CLINIC | Age: 43
End: 2023-01-03
Payer: COMMERCIAL

## 2023-01-03 DIAGNOSIS — F41.9 ANXIETY: ICD-10-CM

## 2023-01-03 DIAGNOSIS — F32.4 MAJOR DEPRESSION SINGLE EPISODE, IN PARTIAL REMISSION (HCC): Primary | ICD-10-CM

## 2023-01-03 DIAGNOSIS — F51.01 PRIMARY INSOMNIA: ICD-10-CM

## 2023-01-03 PROCEDURE — 99213 OFFICE O/P EST LOW 20 MIN: CPT | Performed by: FAMILY MEDICINE

## 2023-01-03 RX ORDER — FLUOXETINE HYDROCHLORIDE 20 MG/1
CAPSULE ORAL
Qty: 90 CAPSULE | Refills: 5 | Status: SHIPPED | OUTPATIENT
Start: 2023-01-03

## 2023-01-03 RX ORDER — TRAZODONE HYDROCHLORIDE 150 MG/1
150 TABLET ORAL NIGHTLY
Qty: 30 TABLET | Refills: 5 | Status: SHIPPED | OUTPATIENT
Start: 2023-01-03

## 2023-01-03 RX ORDER — BUPROPION HYDROCHLORIDE 150 MG/1
150 TABLET ORAL EVERY MORNING
Qty: 30 TABLET | Refills: 5 | Status: SHIPPED | OUTPATIENT
Start: 2023-01-03

## 2023-01-03 RX ORDER — ALPRAZOLAM 1 MG/1
1 TABLET ORAL 3 TIMES DAILY PRN
Qty: 90 TABLET | Refills: 0 | Status: SHIPPED | OUTPATIENT
Start: 2023-01-03 | End: 2023-02-02

## 2023-02-03 DIAGNOSIS — F41.9 ANXIETY: ICD-10-CM

## 2023-02-03 RX ORDER — ALPRAZOLAM 1 MG/1
1 TABLET ORAL 3 TIMES DAILY PRN
Qty: 90 TABLET | Refills: 0 | Status: SHIPPED | OUTPATIENT
Start: 2023-02-03 | End: 2023-03-05

## 2023-02-03 NOTE — TELEPHONE ENCOUNTER
Last office visit 1/3/2023     Last written     Next office visit scheduled Visit date not found    Requested Prescriptions     Pending Prescriptions Disp Refills    ALPRAZolam (XANAX) 1 MG tablet [Pharmacy Med Name: ALPRAZOLAM 1 MG TABLET] 90 tablet 0     Sig: TAKE 1 TABLET BY MOUTH 3 TIMES DAILY AS NEEDED FOR ANXIETY FOR UP TO 30 DAYS.

## 2023-03-03 DIAGNOSIS — F41.9 ANXIETY: ICD-10-CM

## 2023-03-05 RX ORDER — ALPRAZOLAM 1 MG/1
1 TABLET ORAL 3 TIMES DAILY PRN
Qty: 90 TABLET | Refills: 0 | Status: SHIPPED | OUTPATIENT
Start: 2023-03-05 | End: 2023-04-04

## 2023-04-06 DIAGNOSIS — F41.9 ANXIETY: ICD-10-CM

## 2023-04-07 RX ORDER — ALPRAZOLAM 1 MG/1
1 TABLET ORAL 3 TIMES DAILY PRN
Qty: 90 TABLET | Refills: 0 | OUTPATIENT
Start: 2023-04-07 | End: 2023-05-07

## 2023-04-26 ENCOUNTER — HOSPITAL ENCOUNTER (OUTPATIENT)
Dept: MRI IMAGING | Age: 43
Discharge: HOME | End: 2023-04-26
Payer: COMMERCIAL

## 2023-04-26 DIAGNOSIS — M47.816 LUMBAR SPONDYLOSIS: ICD-10-CM

## 2023-04-26 DIAGNOSIS — M54.50 LOW BACK PAIN, UNSPECIFIED BACK PAIN LATERALITY, UNSPECIFIED CHRONICITY, UNSPECIFIED WHETHER SCIATICA PRESENT: ICD-10-CM

## 2023-04-26 DIAGNOSIS — M54.16 LUMBAR RADICULOPATHY: ICD-10-CM

## 2023-04-26 PROCEDURE — 72148 MRI LUMBAR SPINE W/O DYE: CPT

## 2023-05-06 DIAGNOSIS — F41.9 ANXIETY: ICD-10-CM

## 2023-05-07 RX ORDER — ALPRAZOLAM 1 MG/1
1 TABLET ORAL 3 TIMES DAILY PRN
Qty: 90 TABLET | Refills: 0 | OUTPATIENT
Start: 2023-05-07 | End: 2023-06-06

## 2023-05-08 DIAGNOSIS — F41.9 ANXIETY: ICD-10-CM

## 2023-05-08 RX ORDER — ALPRAZOLAM 1 MG/1
1 TABLET ORAL 3 TIMES DAILY PRN
Qty: 90 TABLET | Refills: 0 | OUTPATIENT
Start: 2023-05-08 | End: 2023-06-07

## 2023-05-10 DIAGNOSIS — F41.9 ANXIETY: ICD-10-CM

## 2023-05-10 RX ORDER — ALPRAZOLAM 1 MG/1
1 TABLET ORAL 3 TIMES DAILY PRN
Qty: 90 TABLET | Refills: 0 | Status: SHIPPED | OUTPATIENT
Start: 2023-05-10 | End: 2023-06-09

## 2023-06-09 DIAGNOSIS — F41.9 ANXIETY: ICD-10-CM

## 2023-06-09 NOTE — TELEPHONE ENCOUNTER
Last office visit 4/13/2023     Last written     Next office visit scheduled Visit date not found    Requested Prescriptions     Pending Prescriptions Disp Refills    ALPRAZolam (XANAX) 1 MG tablet [Pharmacy Med Name: ALPRAZOLAM 1 MG TABLET] 90 tablet 0     Sig: TAKE 1 TABLET BY MOUTH 3 TIMES DAILY AS NEEDED FOR ANXIETY FOR UP TO 30 DAYS.

## 2023-06-10 RX ORDER — ALPRAZOLAM 1 MG/1
1 TABLET ORAL 3 TIMES DAILY PRN
Qty: 90 TABLET | Refills: 0 | Status: SHIPPED | OUTPATIENT
Start: 2023-06-10 | End: 2023-07-10

## 2023-07-12 NOTE — PROGRESS NOTES
Subjective:      Patient ID: Renny Steven is a 40 y.o. male. HPI  Renny Steven, was evaluated through a synchronous (real-time) audio-video encounter. The patient (or guardian if applicable) is aware that this is a billable service, which includes applicable co-pays. This Virtual Visit was conducted with patient's (and/or legal guardian's) consent. Patient identification was verified, and a caregiver was present when appropriate. The patient was located at Home: [address] [place] [address]  Provider was located at [facility] ([address] [place] [address]): [address] [facility],  [address] [facility]         Total time spent for this encounter: Not billed by time    --Lacey Trejo DO on 7/12/2023 at 1:41 PM    An electronic signature was used to authenticate this note. Anxiety:  Patient takes Xanax 1 mg TID prn. He feels that it works well to control his anxiety. He does not always take it TID. Review of Systems   Constitutional:  Negative for chills and fever. Psychiatric/Behavioral:  Positive for dysphoric mood. Negative for suicidal ideas. The patient is nervous/anxious. BP Readings from Last 3 Encounters:   08/16/22 105/70   07/19/22 118/88   04/11/22 132/88        There were no vitals taken for this visit. Objective:   Physical Exam  Constitutional:       General: He is not in acute distress. Appearance: Normal appearance. He is normal weight. He is not ill-appearing or toxic-appearing. HENT:      Head: Normocephalic. Pulmonary:      Effort: Pulmonary effort is normal.   Neurological:      Mental Status: He is alert and oriented to person, place, and time. Psychiatric:         Mood and Affect: Mood normal.         Behavior: Behavior normal.         Thought Content:  Thought content normal.         Judgment: Judgment normal.       Assessment:      Anxiety       Plan:      OARRS report was reviewed  Medications refilled   RTO 3 months for Depression /

## 2023-07-13 ENCOUNTER — TELEMEDICINE (OUTPATIENT)
Dept: FAMILY MEDICINE CLINIC | Age: 43
End: 2023-07-13
Payer: COMMERCIAL

## 2023-07-13 DIAGNOSIS — F41.9 ANXIETY: Primary | ICD-10-CM

## 2023-07-13 PROCEDURE — 99213 OFFICE O/P EST LOW 20 MIN: CPT | Performed by: FAMILY MEDICINE

## 2023-07-13 RX ORDER — ALPRAZOLAM 1 MG/1
1 TABLET ORAL 3 TIMES DAILY PRN
Qty: 90 TABLET | Refills: 0 | Status: SHIPPED | OUTPATIENT
Start: 2023-07-13 | End: 2023-08-12

## 2023-07-13 RX ORDER — BUPROPION HYDROCHLORIDE 150 MG/1
150 TABLET ORAL EVERY MORNING
Qty: 30 TABLET | Refills: 2 | Status: SHIPPED | OUTPATIENT
Start: 2023-07-13

## 2023-07-13 RX ORDER — DESVENLAFAXINE SUCCINATE 50 MG/1
50 TABLET, EXTENDED RELEASE ORAL DAILY
Qty: 30 TABLET | Refills: 2 | Status: SHIPPED | OUTPATIENT
Start: 2023-07-13

## 2023-07-19 ASSESSMENT — ENCOUNTER SYMPTOMS: BACK PAIN: 1

## 2023-07-19 NOTE — PROGRESS NOTES
Subjective:      Patient ID: Fidencio Riggs is a 40 y.o. male. HPI  PREOPERATIVE PHYSICAL:    Patient was sent by Dr. Ivory Swann for preoperative clearance to have Lumbar Laminectomy with [facility] on [facility] on 23. No Known Allergies  Current Outpatient Medications   Medication Sig Dispense Refill    ALPRAZolam (XANAX) 1 MG tablet Take 1 tablet by mouth 3 times daily as needed for Anxiety for up to 30 days. 90 tablet 0    desvenlafaxine succinate (PRISTIQ) 50 MG TB24 extended release tablet Take 1 tablet by mouth daily 30 tablet 2    buPROPion (WELLBUTRIN XL) 150 MG extended release tablet Take 1 tablet by mouth every morning 30 tablet 2    traZODone (DESYREL) 150 MG tablet Take 1 tablet by mouth nightly 30 tablet 5     No current facility-administered medications for this visit.      Past Medical History:   Diagnosis Date    Anxiety     Degeneration of lumbar intervertebral disc     Workman's Comp / Dr. Joyner Medico    Former smoker     Lumbar disc herniation 2018    Left L5-S1 on MRI    Major depression single episode, in partial remission ([address])     Obstructive sleep apnea 10/2022    CPAP per Dr. Cathie Daniels    Primary insomnia      Past Surgical History:   Procedure Laterality Date    NOSE SURGERY Bilateral 10/26/2021    SEPTOPLASTY, BILATERAL NASAL VALVE REPAIR performed by Seth Hinojosa MD at [address] Replaced by [facility] N/A 10/26/2021    INFERIOR TURBINATE REDUCTION performed by Seth Hinojosa MD at [address] [place] [address]       Social History     Tobacco Use    Smoking status: Former     Packs/day: 1.00     Years: 15.00     Pack years: 15.00     Types: Cigarettes     Quit date: 2010     Years since quittin.9     Passive exposure: Past    Smokeless tobacco: Current     Types: Chew    Tobacco comments:     discussed oral cancer risks    Vaping Use    Vaping Use: Never used   Substance Use Topics    Alcohol use: Yes     Comment: Occasional    Drug

## 2023-07-20 ENCOUNTER — OFFICE VISIT (OUTPATIENT)
Dept: FAMILY MEDICINE CLINIC | Age: 43
End: 2023-07-20
Payer: COMMERCIAL

## 2023-07-20 VITALS
WEIGHT: 170 LBS | BODY MASS INDEX: 31.28 KG/M2 | SYSTOLIC BLOOD PRESSURE: 120 MMHG | HEIGHT: 62 IN | DIASTOLIC BLOOD PRESSURE: 70 MMHG

## 2023-07-20 DIAGNOSIS — Z01.818 PREOP GENERAL PHYSICAL EXAM: Primary | ICD-10-CM

## 2023-07-20 DIAGNOSIS — M51.36 DEGENERATION OF LUMBAR INTERVERTEBRAL DISC: ICD-10-CM

## 2023-07-20 PROCEDURE — 36415 COLL VENOUS BLD VENIPUNCTURE: CPT | Performed by: FAMILY MEDICINE

## 2023-07-20 PROCEDURE — 99212 OFFICE O/P EST SF 10 MIN: CPT | Performed by: FAMILY MEDICINE

## 2023-07-20 PROCEDURE — 93000 ELECTROCARDIOGRAM COMPLETE: CPT | Performed by: FAMILY MEDICINE

## 2023-07-20 ASSESSMENT — ENCOUNTER SYMPTOMS
NAUSEA: 0
SORE THROAT: 0
ABDOMINAL PAIN: 0
RHINORRHEA: 0
DIARRHEA: 0
CONSTIPATION: 0
SHORTNESS OF BREATH: 0
VOMITING: 0
BLOOD IN STOOL: 0
COUGH: 0
WHEEZING: 0

## 2023-07-21 ENCOUNTER — TELEPHONE (OUTPATIENT)
Dept: FAMILY MEDICINE CLINIC | Age: 43
End: 2023-07-21

## 2023-07-21 DIAGNOSIS — Z01.818 PREOP GENERAL PHYSICAL EXAM: Primary | ICD-10-CM

## 2023-07-21 LAB
ANION GAP SERPL CALCULATED.3IONS-SCNC: 11 MMOL/L (ref 3–16)
BUN SERPL-MCNC: 10 MG/DL (ref 7–20)
CALCIUM SERPL-MCNC: 10.1 MG/DL (ref 8.3–10.6)
CHLORIDE SERPL-SCNC: 102 MMOL/L (ref 99–110)
CO2 SERPL-SCNC: 27 MMOL/L (ref 21–32)
CREAT SERPL-MCNC: 1.2 MG/DL (ref 0.9–1.3)
DEPRECATED RDW RBC AUTO: 13.7 % (ref 12.4–15.4)
GFR SERPLBLD CREATININE-BSD FMLA CKD-EPI: >60 ML/MIN/{1.73_M2}
GLUCOSE SERPL-MCNC: 106 MG/DL (ref 70–99)
HCT VFR BLD AUTO: 47.6 % (ref 40.5–52.5)
HGB BLD-MCNC: 15.8 G/DL (ref 13.5–17.5)
INR PPP: 0.92 (ref 0.84–1.16)
MCH RBC QN AUTO: 27.2 PG (ref 26–34)
MCHC RBC AUTO-ENTMCNC: 33.3 G/DL (ref 31–36)
MCV RBC AUTO: 81.6 FL (ref 80–100)
PLATELET # BLD AUTO: 267 K/UL (ref 135–450)
PMV BLD AUTO: 8.8 FL (ref 5–10.5)
POTASSIUM SERPL-SCNC: 4.6 MMOL/L (ref 3.5–5.1)
PROTHROMBIN TIME: 12.4 SEC (ref 11.5–14.8)
RBC # BLD AUTO: 5.83 M/UL (ref 4.2–5.9)
REASON FOR REJECTION: NORMAL
REJECTED TEST: NORMAL
SODIUM SERPL-SCNC: 140 MMOL/L (ref 136–145)
WBC # BLD AUTO: 6.1 K/UL (ref 4–11)

## 2023-07-21 NOTE — TELEPHONE ENCOUNTER
Pt need work letter stating that he was seen on 07/20/2023. The first work letter has the incorrect date. Pt will come into office to  letter.

## 2023-07-21 NOTE — TELEPHONE ENCOUNTER
----- Message from Dixon Mena sent at 7/21/2023 12:11 PM EDT -----  Subject: Message to Provider    QUESTIONS  Information for Provider? Patient is still waiting for a doctor's note for   the visit on 7/20. Can this be sent to his email today asap? Please   advise.  ---------------------------------------------------------------------------  --------------  Darrell Young KPQY  [phone]; OK to leave message on voicemail  ---------------------------------------------------------------------------  --------------  SCRIPT ANSWERS  Relationship to Patient?  Self

## 2023-08-01 DIAGNOSIS — Z01.818 PREOP GENERAL PHYSICAL EXAM: ICD-10-CM

## 2023-08-01 LAB — APTT BLD: 27.3 SEC (ref 22.7–35.9)

## 2023-08-02 ENCOUNTER — TELEPHONE (OUTPATIENT)
Dept: FAMILY MEDICINE CLINIC | Age: 43
End: 2023-08-02

## 2023-08-02 NOTE — TELEPHONE ENCOUNTER
Patrick with [facility] called needing the pt's lab work, EKG if one was done, history and physical faxed to [phone].

## 2023-08-10 ENCOUNTER — TELEPHONE (OUTPATIENT)
Dept: FAMILY MEDICINE CLINIC | Age: 43
End: 2023-08-10

## 2023-08-10 DIAGNOSIS — F41.9 ANXIETY: ICD-10-CM

## 2023-08-10 RX ORDER — ALPRAZOLAM 1 MG/1
1 TABLET ORAL 3 TIMES DAILY PRN
Qty: 90 TABLET | Refills: 0 | Status: SHIPPED | OUTPATIENT
Start: 2023-08-10 | End: 2023-09-09

## 2023-08-10 NOTE — TELEPHONE ENCOUNTER
Last office visit 7/20/2023     Last written      Next office visit scheduled Visit date not found    Requested Prescriptions     Pending Prescriptions Disp Refills    ALPRAZolam (XANAX) 1 MG tablet [Pharmacy Med Name: ALPRAZOLAM 1 MG TABLET] 90 tablet 0     Sig: TAKE 1 TABLET BY MOUTH 3 TIMES DAILY AS NEEDED FOR ANXIETY FOR UP TO 30 DAYS.

## 2023-08-10 NOTE — TELEPHONE ENCOUNTER
[facility] pharmacy inside of [employer] calling. Pt was prescribed ROBAXIN 750MG AND PERCOCET 5-325 MG S/P Back SX 8/9/23. Pharmacy asking if pt is allowed to fill Xanax that was prescribed today? Thank you!

## 2023-09-11 DIAGNOSIS — F41.9 ANXIETY: ICD-10-CM

## 2023-09-11 RX ORDER — ALPRAZOLAM 1 MG/1
1 TABLET ORAL 3 TIMES DAILY PRN
Qty: 90 TABLET | Refills: 0 | Status: SHIPPED | OUTPATIENT
Start: 2023-09-11 | End: 2023-10-11

## 2023-10-09 DIAGNOSIS — F41.9 ANXIETY: ICD-10-CM

## 2023-10-09 RX ORDER — BUPROPION HYDROCHLORIDE 150 MG/1
150 TABLET ORAL EVERY MORNING
Qty: 30 TABLET | Refills: 0 | Status: SHIPPED | OUTPATIENT
Start: 2023-10-09

## 2023-10-09 RX ORDER — DESVENLAFAXINE SUCCINATE 50 MG/1
50 TABLET, EXTENDED RELEASE ORAL DAILY
Qty: 30 TABLET | Refills: 0 | Status: SHIPPED | OUTPATIENT
Start: 2023-10-09

## 2023-10-09 RX ORDER — ALPRAZOLAM 1 MG/1
1 TABLET ORAL 3 TIMES DAILY PRN
Qty: 90 TABLET | Refills: 0 | Status: SHIPPED | OUTPATIENT
Start: 2023-10-09 | End: 2023-11-08

## 2023-10-09 NOTE — TELEPHONE ENCOUNTER
Last office visit 7/20/2023     Next office visit scheduled 10/12/2023    Requested Prescriptions     Pending Prescriptions Disp Refills    ALPRAZolam (XANAX) 1 MG tablet 90 tablet 0     Sig: Take 1 tablet by mouth 3 times daily as needed for Anxiety for up to 30 days.     buPROPion (WELLBUTRIN XL) 150 MG extended release tablet 30 tablet 2     Sig: Take 1 tablet by mouth every morning    desvenlafaxine succinate (PRISTIQ) 50 MG TB24 extended release tablet 30 tablet 2     Sig: Take 1 tablet by mouth daily

## 2023-10-11 NOTE — PROGRESS NOTES
Subjective:      Patient ID: Pily Xiao is a 40 y.o. male. HPI  Pily Xiao, was evaluated through a synchronous (real-time) audio-video encounter. The patient (or guardian if applicable) is aware that this is a billable service, which includes applicable co-pays. This Virtual Visit was conducted with patient's (and/or legal guardian's) consent. Patient identification was verified, and a caregiver was present when appropriate. The patient was located at Home: [address] [place] [address]  Provider was located at [facility] ([address] [place], [place] [address]t): [address] [facility],  [address] [facility]       Total time spent for this encounter: Not billed by time    --Mir Wong DO on 10/11/2023 at 8:05 AM    An electronic signature was used to authenticate this note. Depression: Patient is tolerating and compliant with Pristiq 50 mg daily, Wellbutrin  mg daily and Trazodone 150 mg nightly as needed. He feels that the medication is helping with his symptoms. He is sleeping well and denies any suicidal ideation. He feels that he still needs to be on the medication. Anxiety:  Patient takes Xanax 1 mg TID prn. He feels that it works well to control his anxiety. He does not always take it TID. Insomnia:  Patient takes Trazodone 150 mg nightly needed and feels that it works well to get him to sleep. Sleep Apnea:  Patient is non compliant with CPAP per Dr. Raúl Be due to nose bleeds with it. He states that he is \"sleeping so much better since his back surgery\". He has less daytime somnolence. Review of Systems    BP Readings from Last 3 Encounters:   07/20/23 120/70   08/16/22 105/70   07/19/22 118/88        There were no vitals taken for this visit. Objective:   Physical Exam  Constitutional:       General: He is not in acute distress. Appearance: Normal appearance. He is normal weight. He is not ill-appearing or toxic-appearing.    HENT:      Head:

## 2023-10-12 ENCOUNTER — TELEMEDICINE (OUTPATIENT)
Dept: FAMILY MEDICINE CLINIC | Age: 43
End: 2023-10-12
Payer: COMMERCIAL

## 2023-10-12 DIAGNOSIS — F41.9 ANXIETY: ICD-10-CM

## 2023-10-12 DIAGNOSIS — F51.01 PRIMARY INSOMNIA: ICD-10-CM

## 2023-10-12 DIAGNOSIS — F32.4 MAJOR DEPRESSION SINGLE EPISODE, IN PARTIAL REMISSION (HCC): Primary | ICD-10-CM

## 2023-10-12 DIAGNOSIS — G47.33 OBSTRUCTIVE SLEEP APNEA: ICD-10-CM

## 2023-10-12 PROCEDURE — 99213 OFFICE O/P EST LOW 20 MIN: CPT | Performed by: FAMILY MEDICINE

## 2023-10-12 RX ORDER — TRAZODONE HYDROCHLORIDE 150 MG/1
150 TABLET ORAL NIGHTLY
Qty: 30 TABLET | Refills: 5 | Status: CANCELLED | OUTPATIENT
Start: 2023-10-12

## 2023-11-08 DIAGNOSIS — F41.9 ANXIETY: ICD-10-CM

## 2023-11-08 RX ORDER — ALPRAZOLAM 1 MG/1
1 TABLET ORAL 3 TIMES DAILY PRN
Qty: 90 TABLET | Refills: 0 | Status: SHIPPED | OUTPATIENT
Start: 2023-11-08 | End: 2023-12-08

## 2023-11-08 NOTE — TELEPHONE ENCOUNTER
Last office visit 10/12/2023     Last written      Next office visit scheduled Visit date not found    Requested Prescriptions     Pending Prescriptions Disp Refills    ALPRAZolam (XANAX) 1 MG tablet [Pharmacy Med Name: ALPRAZOLAM 1 MG TABLET] 90 tablet 0     Sig: Take 1 tablet by mouth 3 times daily as needed for Anxiety for up to 30 days.

## 2023-12-09 DIAGNOSIS — F41.9 ANXIETY: ICD-10-CM

## 2023-12-10 RX ORDER — ALPRAZOLAM 1 MG/1
1 TABLET ORAL 3 TIMES DAILY PRN
Qty: 90 TABLET | Refills: 0 | Status: SHIPPED | OUTPATIENT
Start: 2023-12-10 | End: 2024-01-09

## 2024-01-12 DIAGNOSIS — F41.9 ANXIETY: ICD-10-CM

## 2024-01-12 RX ORDER — ALPRAZOLAM 1 MG/1
1 TABLET ORAL 3 TIMES DAILY PRN
Qty: 90 TABLET | Refills: 0 | OUTPATIENT
Start: 2024-01-12 | End: 2024-02-11

## 2024-01-12 NOTE — TELEPHONE ENCOUNTER
Last office visit 10/12/2023       Next office visit scheduled Visit date not found    Requested Prescriptions     Pending Prescriptions Disp Refills    ALPRAZolam (XANAX) 1 MG tablet [Pharmacy Med Name: ALPRAZOLAM 1 MG TABLET] 90 tablet 0     Sig: Take 1 tablet by mouth 3 times daily as needed for Anxiety for up to 30 days.

## 2024-01-17 ENCOUNTER — TELEPHONE (OUTPATIENT)
Dept: FAMILY MEDICINE CLINIC | Age: 44
End: 2024-01-17

## 2024-01-17 NOTE — TELEPHONE ENCOUNTER
----- Message from Maryam Galvan sent at 1/17/2024  8:41 AM EST -----  Subject: Refill Request    QUESTIONS  Name of Medication? ALPRAZolam (XANAX) 1 MG tablet  Patient-reported dosage and instructions? 1 MG TID  How many days do you have left? 0  Preferred Pharmacy? [facility] [id number] [facility] [employer]  Pharmacy phone number (if available)? [phone]  Additional Information for Provider? PATIENT HAS BEEN OUT SINCE 1/11/24.   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 OK to leave message on   voicemail  Preferred Call Back Phone Number? [phone]  ---------------------------------------------------------------------------  --------------  SCRIPT ANSWERS  Relationship to Patient? Self

## 2024-01-17 NOTE — TELEPHONE ENCOUNTER
It is a law that he needs to be seen every 90 days for a controlled medication.  His rescheduled appointment is almost 1 month away.  I need to see him in order to refill the medication.

## 2024-01-18 ENCOUNTER — TELEMEDICINE (OUTPATIENT)
Dept: FAMILY MEDICINE CLINIC | Age: 44
End: 2024-01-18
Payer: COMMERCIAL

## 2024-01-18 DIAGNOSIS — F41.9 ANXIETY: Primary | ICD-10-CM

## 2024-01-18 PROCEDURE — 99213 OFFICE O/P EST LOW 20 MIN: CPT | Performed by: FAMILY MEDICINE

## 2024-01-18 RX ORDER — ALPRAZOLAM 1 MG/1
1 TABLET ORAL 3 TIMES DAILY PRN
Qty: 90 TABLET | Refills: 0 | Status: SHIPPED | OUTPATIENT
Start: 2024-01-18 | End: 2024-02-17

## 2024-01-18 ASSESSMENT — PATIENT HEALTH QUESTIONNAIRE - PHQ9
2. FEELING DOWN, DEPRESSED OR HOPELESS: MORE THAN HALF THE DAYS
8. MOVING OR SPEAKING SO SLOWLY THAT OTHER PEOPLE COULD HAVE NOTICED. OR THE OPPOSITE - BEING SO FIDGETY OR RESTLESS THAT YOU HAVE BEEN MOVING AROUND A LOT MORE THAN USUAL: NOT AT ALL
9. THOUGHTS THAT YOU WOULD BE BETTER OFF DEAD, OR OF HURTING YOURSELF: NOT AT ALL
5. POOR APPETITE OR OVEREATING: MORE THAN HALF THE DAYS
7. TROUBLE CONCENTRATING ON THINGS, SUCH AS READING THE NEWSPAPER OR WATCHING TELEVISION: 0
4. FEELING TIRED OR HAVING LITTLE ENERGY: 2
SUM OF ALL RESPONSES TO PHQ QUESTIONS 1-9: 12
2. FEELING DOWN, DEPRESSED OR HOPELESS: 2
SUM OF ALL RESPONSES TO PHQ9 QUESTIONS 1 & 2: 4
SUM OF ALL RESPONSES TO PHQ QUESTIONS 1-9: 12
SUM OF ALL RESPONSES TO PHQ QUESTIONS 1-9: 12
7. TROUBLE CONCENTRATING ON THINGS, SUCH AS READING THE NEWSPAPER OR WATCHING TELEVISION: NOT AT ALL
1. LITTLE INTEREST OR PLEASURE IN DOING THINGS: MORE THAN HALF THE DAYS
6. FEELING BAD ABOUT YOURSELF - OR THAT YOU ARE A FAILURE OR HAVE LET YOURSELF OR YOUR FAMILY DOWN: 2
8. MOVING OR SPEAKING SO SLOWLY THAT OTHER PEOPLE COULD HAVE NOTICED. OR THE OPPOSITE, BEING SO FIGETY OR RESTLESS THAT YOU HAVE BEEN MOVING AROUND A LOT MORE THAN USUAL: 0
3. TROUBLE FALLING OR STAYING ASLEEP: 2
10. IF YOU CHECKED OFF ANY PROBLEMS, HOW DIFFICULT HAVE THESE PROBLEMS MADE IT FOR YOU TO DO YOUR WORK, TAKE CARE OF THINGS AT HOME, OR GET ALONG WITH OTHER PEOPLE: SOMEWHAT DIFFICULT
6. FEELING BAD ABOUT YOURSELF - OR THAT YOU ARE A FAILURE OR HAVE LET YOURSELF OR YOUR FAMILY DOWN: MORE THAN HALF THE DAYS
9. THOUGHTS THAT YOU WOULD BE BETTER OFF DEAD, OR OF HURTING YOURSELF: 0
SUM OF ALL RESPONSES TO PHQ QUESTIONS 1-9: 12
3. TROUBLE FALLING OR STAYING ASLEEP: MORE THAN HALF THE DAYS
4. FEELING TIRED OR HAVING LITTLE ENERGY: MORE THAN HALF THE DAYS
SUM OF ALL RESPONSES TO PHQ QUESTIONS 1-9: 12
10. IF YOU CHECKED OFF ANY PROBLEMS, HOW DIFFICULT HAVE THESE PROBLEMS MADE IT FOR YOU TO DO YOUR WORK, TAKE CARE OF THINGS AT HOME, OR GET ALONG WITH OTHER PEOPLE: 1
5. POOR APPETITE OR OVEREATING: 2
1. LITTLE INTEREST OR PLEASURE IN DOING THINGS: 2

## 2024-01-18 NOTE — PROGRESS NOTES
Monitoring Periodic Controlled Substance Monitoring   1/18/2024   1:46 PM Assessed functional status (ability to engage in work or other purposeful activities, the pain intensity and its interference with activities of daily living, quality of family life and social activities, and the physical activity)                   MICKEY ROSE, DO

## 2024-02-07 RX ORDER — DESVENLAFAXINE SUCCINATE 50 MG/1
50 TABLET, EXTENDED RELEASE ORAL DAILY
Qty: 30 TABLET | Refills: 1 | Status: SHIPPED | OUTPATIENT
Start: 2024-02-07

## 2024-02-13 DIAGNOSIS — F41.9 ANXIETY: ICD-10-CM

## 2024-02-13 RX ORDER — ALPRAZOLAM 1 MG/1
1 TABLET ORAL 3 TIMES DAILY PRN
Qty: 90 TABLET | Refills: 0 | OUTPATIENT
Start: 2024-02-13 | End: 2024-03-14

## 2024-02-13 NOTE — TELEPHONE ENCOUNTER
Last office visit 1/18/2024     Last written      Next office visit scheduled Visit date not found    Requested Prescriptions     Pending Prescriptions Disp Refills    ALPRAZolam (XANAX) 1 MG tablet [Pharmacy Med Name: ALPRAZOLAM 1 MG TABLET] 90 tablet 0     Sig: Take 1 tablet by mouth 3 times daily as needed for Anxiety for up to 30 days.

## 2024-02-21 DIAGNOSIS — F41.9 ANXIETY: ICD-10-CM

## 2024-02-21 RX ORDER — ALPRAZOLAM 1 MG/1
1 TABLET ORAL 3 TIMES DAILY PRN
Qty: 90 TABLET | Refills: 0 | OUTPATIENT
Start: 2024-02-21 | End: 2024-03-22

## 2024-02-22 DIAGNOSIS — F41.9 ANXIETY: ICD-10-CM

## 2024-02-22 RX ORDER — ALPRAZOLAM 1 MG/1
1 TABLET ORAL 3 TIMES DAILY PRN
Qty: 90 TABLET | Refills: 0 | Status: SHIPPED | OUTPATIENT
Start: 2024-02-22 | End: 2024-03-23

## 2024-02-22 NOTE — TELEPHONE ENCOUNTER
Pt called wanting to know why his refill was refused. Pt's last fill was 1/18/24, and should be okay for another fill on 2/18/24. Please inform pt.

## 2024-03-22 DIAGNOSIS — F41.9 ANXIETY: ICD-10-CM

## 2024-03-24 RX ORDER — ALPRAZOLAM 1 MG/1
1 TABLET ORAL 3 TIMES DAILY PRN
Qty: 90 TABLET | Refills: 0 | Status: SHIPPED | OUTPATIENT
Start: 2024-03-24 | End: 2024-04-23

## 2024-04-21 RX ORDER — DESVENLAFAXINE SUCCINATE 50 MG/1
50 TABLET, EXTENDED RELEASE ORAL DAILY
Qty: 30 TABLET | Refills: 0 | Status: SHIPPED | OUTPATIENT
Start: 2024-04-21

## 2024-04-23 DIAGNOSIS — F41.9 ANXIETY: ICD-10-CM

## 2024-04-23 RX ORDER — BUPROPION HYDROCHLORIDE 150 MG/1
150 TABLET ORAL EVERY MORNING
Qty: 30 TABLET | Refills: 2 | Status: SHIPPED | OUTPATIENT
Start: 2024-04-23 | End: 2024-04-24 | Stop reason: SDUPTHER

## 2024-04-23 RX ORDER — ALPRAZOLAM 1 MG/1
1 TABLET ORAL 3 TIMES DAILY PRN
Qty: 90 TABLET | Refills: 0 | OUTPATIENT
Start: 2024-04-23 | End: 2024-05-23

## 2024-04-23 NOTE — TELEPHONE ENCOUNTER
Last office visit 1/18/2024     Last written      Next office visit scheduled Visit date not found    Requested Prescriptions     Pending Prescriptions Disp Refills    ALPRAZolam (XANAX) 1 MG tablet [Pharmacy Med Name: ALPRAZOLAM 1 MG TABLET] 90 tablet 0     Sig: Take 1 tablet by mouth 3 times daily as needed for Anxiety for up to 30 days.    buPROPion (WELLBUTRIN XL) 150 MG extended release tablet [Pharmacy Med Name: BUPROPION HCL  MG TABLET] 30 tablet 2     Sig: TAKE 1 TABLET BY MOUTH EVERY DAY IN THE MORNING

## 2024-04-24 ENCOUNTER — TELEMEDICINE (OUTPATIENT)
Dept: FAMILY MEDICINE CLINIC | Age: 44
End: 2024-04-24
Payer: COMMERCIAL

## 2024-04-24 DIAGNOSIS — F51.01 PRIMARY INSOMNIA: ICD-10-CM

## 2024-04-24 DIAGNOSIS — F32.4 MAJOR DEPRESSION SINGLE EPISODE, IN PARTIAL REMISSION (HCC): Primary | ICD-10-CM

## 2024-04-24 DIAGNOSIS — G47.33 OBSTRUCTIVE SLEEP APNEA: ICD-10-CM

## 2024-04-24 DIAGNOSIS — F41.9 ANXIETY: ICD-10-CM

## 2024-04-24 PROCEDURE — 99213 OFFICE O/P EST LOW 20 MIN: CPT | Performed by: FAMILY MEDICINE

## 2024-04-24 RX ORDER — ALPRAZOLAM 1 MG/1
1 TABLET ORAL 3 TIMES DAILY PRN
Qty: 90 TABLET | Refills: 0 | Status: SHIPPED | OUTPATIENT
Start: 2024-04-24 | End: 2024-05-24

## 2024-04-24 RX ORDER — DESVENLAFAXINE SUCCINATE 50 MG/1
50 TABLET, EXTENDED RELEASE ORAL DAILY
Qty: 30 TABLET | Refills: 2 | Status: SHIPPED | OUTPATIENT
Start: 2024-04-24

## 2024-04-24 RX ORDER — BUPROPION HYDROCHLORIDE 150 MG/1
150 TABLET ORAL EVERY MORNING
Qty: 30 TABLET | Refills: 2 | Status: SHIPPED | OUTPATIENT
Start: 2024-04-24

## 2024-04-24 SDOH — ECONOMIC STABILITY: FOOD INSECURITY: WITHIN THE PAST 12 MONTHS, THE FOOD YOU BOUGHT JUST DIDN'T LAST AND YOU DIDN'T HAVE MONEY TO GET MORE.: NEVER TRUE

## 2024-04-24 SDOH — ECONOMIC STABILITY: INCOME INSECURITY: HOW HARD IS IT FOR YOU TO PAY FOR THE VERY BASICS LIKE FOOD, HOUSING, MEDICAL CARE, AND HEATING?: HARD

## 2024-04-24 SDOH — ECONOMIC STABILITY: HOUSING INSECURITY
IN THE LAST 12 MONTHS, WAS THERE A TIME WHEN YOU DID NOT HAVE A STEADY PLACE TO SLEEP OR SLEPT IN A SHELTER (INCLUDING NOW)?: NO

## 2024-04-24 SDOH — ECONOMIC STABILITY: FOOD INSECURITY: WITHIN THE PAST 12 MONTHS, YOU WORRIED THAT YOUR FOOD WOULD RUN OUT BEFORE YOU GOT MONEY TO BUY MORE.: SOMETIMES TRUE

## 2024-04-24 SDOH — ECONOMIC STABILITY: TRANSPORTATION INSECURITY
IN THE PAST 12 MONTHS, HAS LACK OF TRANSPORTATION KEPT YOU FROM MEETINGS, WORK, OR FROM GETTING THINGS NEEDED FOR DAILY LIVING?: PATIENT DECLINED

## 2024-04-24 NOTE — PROGRESS NOTES
Subjective:      Patient ID: Jeffrey De La Cruz is a 43 y.o. male.    HPI  Jeffrey De La Cruz, was evaluated through a synchronous (real-time) audio-video encounter. The patient (or guardian if applicable) is aware that this is a billable service, which includes applicable co-pays. This Virtual Visit was conducted with patient's (and/or legal guardian's) consent. Patient identification was verified, and a caregiver was present when appropriate.   The patient was located at Home: [address] [place] [address] [place] [address]  Provider was located at Facility (Appt Dept): [address] [facility]  [address]  [place] [address]  Confirm you are appropriately licensed, registered, or certified to deliver care in the state where the patient is located as indicated above. If you are not or unsure, please re-schedule the visit: Yes, I confirm.      Total time spent for this encounter: Not billed by time    --MICKEY ROSE,  on 4/23/2024 at 8:40 PM    An electronic signature was used to authenticate this note.    Depression: Patient is tolerating and compliant with Pristiq 50 mg daily, Wellbutrin  mg daily and Trazodone 150 mg nightly as needed.  He feels that the medication is helping with his symptoms.  He is sleeping well and denies any suicidal ideation.  He feels that he still needs to be on the medication.         Anxiety:  Patient takes Xanax 1 mg TID prn.   He feels that it works well to control his anxiety.   He does not always take it TID.       Insomnia:  Patient takes Trazodone 150 mg nightly needed and feels that it works well to get him to sleep.      Sleep Apnea:  Patient is non compliant with CPAP per Dr. Rodriguez due to nose bleeds with it.  He states that he is \"sleeping so much better since his back surgery\".  He has less daytime somnolence.      Review of Systems   Constitutional:  Negative for chills and fever.   Psychiatric/Behavioral:  Positive for sleep disturbance. Negative for dysphoric mood

## 2024-05-23 DIAGNOSIS — F41.9 ANXIETY: ICD-10-CM

## 2024-05-23 NOTE — TELEPHONE ENCOUNTER
Last office visit 4/24/2024       Next office visit scheduled Visit date not found    Requested Prescriptions     Pending Prescriptions Disp Refills    ALPRAZolam (XANAX) 1 MG tablet [Pharmacy Med Name: ALPRAZOLAM 1 MG TABLET] 90 tablet 0     Sig: Take 1 tablet by mouth 3 times daily as needed for Anxiety for up to 30 days.

## 2024-05-24 RX ORDER — ALPRAZOLAM 1 MG/1
1 TABLET ORAL 3 TIMES DAILY PRN
Qty: 90 TABLET | Refills: 0 | Status: SHIPPED | OUTPATIENT
Start: 2024-05-24 | End: 2024-06-23

## 2024-06-25 DIAGNOSIS — F41.9 ANXIETY: ICD-10-CM

## 2024-06-25 RX ORDER — ALPRAZOLAM 1 MG/1
1 TABLET ORAL 3 TIMES DAILY PRN
Qty: 90 TABLET | Refills: 0 | Status: SHIPPED | OUTPATIENT
Start: 2024-06-25 | End: 2024-07-25

## 2024-06-25 NOTE — TELEPHONE ENCOUNTER
Last office visit 4/24/2024       Next office visit scheduled 6/27/2024    Requested Prescriptions     Pending Prescriptions Disp Refills    ALPRAZolam (XANAX) 1 MG tablet [Pharmacy Med Name: ALPRAZOLAM 1 MG TABLET] 90 tablet 0     Sig: Take 1 tablet by mouth 3 times daily as needed for Anxiety for up to 30 days.

## 2024-06-25 NOTE — PROGRESS NOTES
Subjective:      Patient ID: Jeffrey De La Cruz is a 43 y.o. male.    HPI    Depression: Patient is tolerating and compliant with Pristiq 50 mg daily, Wellbutrin  mg daily and Trazodone 150 mg nightly as needed.  He feels that the medication is helping with his symptoms.  He is sleeping well and denies any suicidal ideation.  He feels that he still needs to be on the medication.     Anxiety:  Patient takes Xanax 1 mg TID prn.   He feels that it works well to control his anxiety.   He does not always take it TID.     Insomnia:  Patient takes Trazodone 150 mg nightly needed and feels that it works well to get him to sleep.      Sleep Apnea:  Patient is non compliant with CPAP per Dr. Rodriguez due to nose bleeds with it.  He states that he is \"sleeping so much better since his back surgery\".  He has less daytime somnolence.      Review of Systems   Constitutional:  Negative for chills and fever.   Psychiatric/Behavioral:  Positive for sleep disturbance. Negative for dysphoric mood and suicidal ideas. The patient is nervous/anxious.      /78   Wt 77.8 kg (171 lb 9.6 oz)   BMI 31.39 kg/m²    Objective:   Physical Exam  Vitals reviewed.   Constitutional:       General: He is not in acute distress.     Appearance: He is well-developed.   HENT:      Head: Normocephalic.      Right Ear: External ear normal.      Left Ear: External ear normal.   Neck:      Thyroid: No thyromegaly.      Vascular: No carotid bruit or JVD.   Cardiovascular:      Rate and Rhythm: Normal rate and regular rhythm.      Heart sounds: Normal heart sounds. No murmur heard.  Pulmonary:      Effort: Pulmonary effort is normal.      Breath sounds: Normal breath sounds. No wheezing or rales.   Lymphadenopathy:      Cervical: No cervical adenopathy.   Neurological:      Mental Status: He is alert and oriented to person, place, and time.         Assessment:      Depression   Anxiety   Insomnia   Sleep Apnea       Plan:   Assessment & Plan   OARRS

## 2024-06-27 ENCOUNTER — OFFICE VISIT (OUTPATIENT)
Dept: FAMILY MEDICINE CLINIC | Age: 44
End: 2024-06-27
Payer: COMMERCIAL

## 2024-06-27 VITALS — DIASTOLIC BLOOD PRESSURE: 78 MMHG | WEIGHT: 171.6 LBS | SYSTOLIC BLOOD PRESSURE: 124 MMHG | BODY MASS INDEX: 31.39 KG/M2

## 2024-06-27 DIAGNOSIS — F51.01 PRIMARY INSOMNIA: ICD-10-CM

## 2024-06-27 DIAGNOSIS — F41.9 ANXIETY: ICD-10-CM

## 2024-06-27 DIAGNOSIS — G47.33 OBSTRUCTIVE SLEEP APNEA: ICD-10-CM

## 2024-06-27 DIAGNOSIS — F32.4 MAJOR DEPRESSION SINGLE EPISODE, IN PARTIAL REMISSION (HCC): Primary | ICD-10-CM

## 2024-06-27 PROCEDURE — 99213 OFFICE O/P EST LOW 20 MIN: CPT | Performed by: FAMILY MEDICINE

## 2024-06-27 RX ORDER — DESVENLAFAXINE SUCCINATE 50 MG/1
50 TABLET, EXTENDED RELEASE ORAL DAILY
Qty: 30 TABLET | Refills: 2 | Status: SHIPPED | OUTPATIENT
Start: 2024-06-27

## 2024-06-27 RX ORDER — BUPROPION HYDROCHLORIDE 150 MG/1
150 TABLET ORAL EVERY MORNING
Qty: 30 TABLET | Refills: 2 | Status: SHIPPED | OUTPATIENT
Start: 2024-06-27

## 2024-06-27 RX ORDER — TRAZODONE HYDROCHLORIDE 150 MG/1
150 TABLET ORAL NIGHTLY
Qty: 30 TABLET | Refills: 5 | Status: SHIPPED | OUTPATIENT
Start: 2024-06-27

## 2024-07-22 DIAGNOSIS — F41.9 ANXIETY: ICD-10-CM

## 2024-07-22 RX ORDER — ALPRAZOLAM 1 MG/1
1 TABLET ORAL 3 TIMES DAILY PRN
Qty: 90 TABLET | Refills: 0 | Status: SHIPPED | OUTPATIENT
Start: 2024-07-22 | End: 2024-08-21

## 2024-07-22 NOTE — TELEPHONE ENCOUNTER
Last office visit 6/27/2024     Last written      Next office visit scheduled Visit date not found    Requested Prescriptions     Pending Prescriptions Disp Refills    ALPRAZolam (XANAX) 1 MG tablet [Pharmacy Med Name: ALPRAZOLAM 1 MG TABLET] 90 tablet 0     Sig: Take 1 tablet by mouth 3 times daily as needed for Anxiety for up to 30 days.

## 2024-08-23 DIAGNOSIS — F41.9 ANXIETY: ICD-10-CM

## 2024-08-23 RX ORDER — ALPRAZOLAM 1 MG/1
1 TABLET ORAL 3 TIMES DAILY PRN
Qty: 90 TABLET | Refills: 0 | Status: SHIPPED | OUTPATIENT
Start: 2024-08-23 | End: 2024-09-22

## 2024-08-23 NOTE — TELEPHONE ENCOUNTER
Last office visit 6/27/2024       Next office visit scheduled Visit date not found    Requested Prescriptions     Pending Prescriptions Disp Refills    ALPRAZolam (XANAX) 1 MG tablet [Pharmacy Med Name: ALPRAZOLAM 1 MG TABLET] 90 tablet 0     Sig: Take 1 tablet by mouth 3 times daily as needed for Anxiety for up to 30 days.

## 2024-09-24 DIAGNOSIS — F41.9 ANXIETY: ICD-10-CM

## 2024-09-24 RX ORDER — ALPRAZOLAM 1 MG
1 TABLET ORAL 3 TIMES DAILY PRN
Qty: 90 TABLET | Refills: 0 | Status: SHIPPED | OUTPATIENT
Start: 2024-09-24 | End: 2024-10-24

## 2024-10-24 DIAGNOSIS — F41.9 ANXIETY: ICD-10-CM

## 2024-10-24 RX ORDER — ALPRAZOLAM 1 MG/1
1 TABLET ORAL 3 TIMES DAILY PRN
Qty: 90 TABLET | Refills: 0 | OUTPATIENT
Start: 2024-10-24 | End: 2024-11-23

## 2024-10-24 RX ORDER — DESVENLAFAXINE 50 MG/1
50 TABLET, FILM COATED, EXTENDED RELEASE ORAL DAILY
Qty: 30 TABLET | Refills: 1 | Status: SHIPPED | OUTPATIENT
Start: 2024-10-24

## 2024-10-24 NOTE — TELEPHONE ENCOUNTER
Last office visit 6/27/2024     Last written      Next office visit scheduled Visit date not found    Requested Prescriptions     Pending Prescriptions Disp Refills    ALPRAZolam (XANAX) 1 MG tablet [Pharmacy Med Name: ALPRAZOLAM 1 MG TABLET] 90 tablet 0     Sig: Take 1 tablet by mouth 3 times daily as needed for Anxiety for up to 30 days.    desvenlafaxine succinate (PRISTIQ) 50 MG TB24 extended release tablet [Pharmacy Med Name: DESVENLAFAXINE SUCCNT ER 50 MG] 30 tablet 2     Sig: TAKE 1 TABLET BY MOUTH EVERY DAY

## 2024-10-29 DIAGNOSIS — F41.9 ANXIETY: ICD-10-CM

## 2024-10-29 RX ORDER — ALPRAZOLAM 1 MG/1
1 TABLET ORAL 3 TIMES DAILY PRN
Qty: 90 TABLET | Refills: 0 | OUTPATIENT
Start: 2024-10-29 | End: 2024-11-28

## 2024-10-30 NOTE — PROGRESS NOTES
Subjective:      Patient ID: Jeffrey De La Cruz is a 44 y.o. male.    HPI  Jeffrey De La Cruz, was evaluated through a synchronous (real-time) audio-video encounter. The patient (or guardian if applicable) is aware that this is a billable service, which includes applicable co-pays. This Virtual Visit was conducted with patient's (and/or legal guardian's) consent. Patient identification was verified, and a caregiver was present when appropriate.   The patient was located at Home: [address] [place] [address] [place] [address]  Provider was located at Facility (Appt Dept): [address] [facility]  [address]  [place] [address]  Confirm you are appropriately licensed, registered, or certified to deliver care in the state where the patient is located as indicated above. If you are not or unsure, please re-schedule the visit: Yes, I confirm.      Total time spent for this encounter: Not billed by time    --MICKEY ROSE,  on 10/30/2024 at 11:46 AM    An electronic signature was used to authenticate this note.     Anxiety:  Patient takes Xanax 1 mg TID prn.   He feels that it works well to control his anxiety.   He does not always take it TID.       Review of Systems   Constitutional:  Negative for chills and fever.   Psychiatric/Behavioral:  Negative for dysphoric mood and suicidal ideas. The patient is nervous/anxious.      BP Readings from Last 3 Encounters:   06/27/24 124/78   07/20/23 120/70   08/16/22 105/70      There were no vitals taken for this visit.   Objective:   Physical Exam  Constitutional:       General: He is not in acute distress.     Appearance: Normal appearance. He is normal weight. He is not ill-appearing or toxic-appearing.   HENT:      Head: Normocephalic.   Pulmonary:      Effort: Pulmonary effort is normal.   Neurological:      Mental Status: He is alert and oriented to person, place, and time.   Psychiatric:         Mood and Affect: Mood normal.         Behavior: Behavior normal.         Thought

## 2024-10-31 ENCOUNTER — TELEMEDICINE (OUTPATIENT)
Dept: FAMILY MEDICINE CLINIC | Age: 44
End: 2024-10-31
Payer: COMMERCIAL

## 2024-10-31 DIAGNOSIS — F41.9 ANXIETY: Primary | ICD-10-CM

## 2024-10-31 PROCEDURE — 99213 OFFICE O/P EST LOW 20 MIN: CPT | Performed by: FAMILY MEDICINE

## 2024-10-31 RX ORDER — ALPRAZOLAM 1 MG/1
1 TABLET ORAL 3 TIMES DAILY PRN
Qty: 90 TABLET | Refills: 0 | Status: SHIPPED | OUTPATIENT
Start: 2024-10-31 | End: 2024-11-30

## 2024-11-27 DIAGNOSIS — F41.9 ANXIETY: ICD-10-CM

## 2024-11-27 RX ORDER — ALPRAZOLAM 1 MG/1
1 TABLET ORAL 3 TIMES DAILY PRN
Qty: 90 TABLET | Refills: 0 | Status: SHIPPED | OUTPATIENT
Start: 2024-11-27 | End: 2024-12-27

## 2024-11-27 NOTE — TELEPHONE ENCOUNTER
Last office visit 10/31/2024     Last written      Next office visit scheduled Visit date not found    Requested Prescriptions     Pending Prescriptions Disp Refills    ALPRAZolam (XANAX) 1 MG tablet [Pharmacy Med Name: ALPRAZOLAM 1 MG TABLET] 90 tablet 0     Sig: Take 1 tablet by mouth 3 times daily as needed for Anxiety for up to 30 days.

## 2024-12-23 RX ORDER — DESVENLAFAXINE 50 MG/1
50 TABLET, FILM COATED, EXTENDED RELEASE ORAL DAILY
Qty: 30 TABLET | Refills: 1 | Status: SHIPPED | OUTPATIENT
Start: 2024-12-23

## 2024-12-26 DIAGNOSIS — F41.9 ANXIETY: ICD-10-CM

## 2024-12-26 RX ORDER — ALPRAZOLAM 1 MG/1
1 TABLET ORAL 3 TIMES DAILY PRN
Qty: 90 TABLET | Refills: 0 | Status: SHIPPED | OUTPATIENT
Start: 2024-12-26 | End: 2025-01-25

## 2025-01-29 DIAGNOSIS — F41.9 ANXIETY: ICD-10-CM

## 2025-01-29 RX ORDER — ALPRAZOLAM 1 MG/1
TABLET ORAL
Qty: 90 TABLET | Refills: 0 | Status: SHIPPED | OUTPATIENT
Start: 2025-01-29 | End: 2025-02-28

## 2025-01-29 NOTE — TELEPHONE ENCOUNTER
Last office visit 10/31/2024     Last written      Next office visit scheduled Visit date not found    Requested Prescriptions     Pending Prescriptions Disp Refills    ALPRAZolam (XANAX) 1 MG tablet [Pharmacy Med Name: ALPRAZOLAM 1 MG TABLET] 90 tablet 0     Sig: TAKE 1 TABLET BY MOUTH 3 TIMES DAILY AS NEEDED FOR ANXIETY FOR UP TO 30 DAYS. FILL 12/30

## 2025-02-26 DIAGNOSIS — F41.9 ANXIETY: ICD-10-CM

## 2025-02-26 RX ORDER — ALPRAZOLAM 1 MG/1
1 TABLET ORAL 3 TIMES DAILY PRN
Qty: 90 TABLET | Refills: 0 | OUTPATIENT
Start: 2025-02-26

## 2025-02-26 RX ORDER — DESVENLAFAXINE 50 MG/1
50 TABLET, FILM COATED, EXTENDED RELEASE ORAL DAILY
Qty: 30 TABLET | Refills: 0 | Status: SHIPPED | OUTPATIENT
Start: 2025-02-26 | End: 2025-02-27 | Stop reason: SDUPTHER

## 2025-02-26 RX ORDER — BUPROPION HYDROCHLORIDE 150 MG/1
150 TABLET ORAL EVERY MORNING
Qty: 30 TABLET | Refills: 0 | Status: SHIPPED | OUTPATIENT
Start: 2025-02-26 | End: 2025-02-27 | Stop reason: SDUPTHER

## 2025-02-26 NOTE — TELEPHONE ENCOUNTER
Last office visit 10/31/2024       Next office visit scheduled 2/27/2025    Requested Prescriptions     Pending Prescriptions Disp Refills    ALPRAZolam (XANAX) 1 MG tablet [Pharmacy Med Name: ALPRAZOLAM 1 MG TABLET] 90 tablet 0     Sig: TAKE 1 TABLET BY MOUTH 3 TIMES DAILY AS NEEDED FOR ANXIETY FOR UP TO 30 DAYS.

## 2025-02-26 NOTE — PROGRESS NOTES
Subjective:      Patient ID: Jeffrey De La Cruz is a 44 y.o. male.    HPI    Depression: Patient is tolerating and compliant with Pristiq 50 mg daily, Wellbutrin  mg daily and Trazodone 150 mg nightly as needed.  He feels that the medication is helping with his symptoms.  He is sleeping well and denies any suicidal ideation.  He feels that he still needs to be on the medication.     Anxiety:  Patient takes Xanax 1 mg TID prn.   He feels that it works well to control his anxiety.   He does not always take it TID.     Insomnia:  Patient has not needed to take the Trazodone in some time.       Sleep Apnea:  Patient is non compliant with CPAP per Dr. Rodriguez due to nose bleeds with it.  He states that he is \"sleeping so much better since his back surgery\".  He has less daytime somnolence.      Tobacco Abuse:  Patient uses nicotine pouches and would like to quit.  He would like to try taking Chantix to help.      Review of Systems   Constitutional:  Negative for chills and fever.   Psychiatric/Behavioral:  Positive for sleep disturbance. Negative for dysphoric mood and suicidal ideas. The patient is nervous/anxious.      /84   Ht 1.575 m (5' 2\")   Wt 77.1 kg (170 lb)   BMI 31.09 kg/m²    Objective:   Physical Exam  Vitals reviewed.   Constitutional:       General: He is not in acute distress.     Appearance: He is well-developed.   HENT:      Head: Normocephalic.      Right Ear: External ear normal.      Left Ear: External ear normal.   Neck:      Thyroid: No thyromegaly.      Vascular: No carotid bruit or JVD.   Cardiovascular:      Rate and Rhythm: Normal rate and regular rhythm.      Heart sounds: Normal heart sounds. No murmur heard.  Pulmonary:      Effort: Pulmonary effort is normal.      Breath sounds: Normal breath sounds. No wheezing or rales.   Lymphadenopathy:      Cervical: No cervical adenopathy.   Neurological:      Mental Status: He is alert and oriented to person, place, and time.

## 2025-02-27 ENCOUNTER — OFFICE VISIT (OUTPATIENT)
Dept: FAMILY MEDICINE CLINIC | Age: 45
End: 2025-02-27

## 2025-02-27 VITALS
SYSTOLIC BLOOD PRESSURE: 138 MMHG | BODY MASS INDEX: 31.28 KG/M2 | HEIGHT: 62 IN | DIASTOLIC BLOOD PRESSURE: 84 MMHG | WEIGHT: 170 LBS

## 2025-02-27 DIAGNOSIS — F32.4 MAJOR DEPRESSION SINGLE EPISODE, IN PARTIAL REMISSION: Primary | ICD-10-CM

## 2025-02-27 DIAGNOSIS — Z72.0 TOBACCO ABUSE: ICD-10-CM

## 2025-02-27 DIAGNOSIS — F51.01 PRIMARY INSOMNIA: ICD-10-CM

## 2025-02-27 DIAGNOSIS — F41.9 ANXIETY: ICD-10-CM

## 2025-02-27 DIAGNOSIS — Z23 NEED FOR INFLUENZA VACCINATION: ICD-10-CM

## 2025-02-27 DIAGNOSIS — G47.33 OBSTRUCTIVE SLEEP APNEA: ICD-10-CM

## 2025-02-27 RX ORDER — VARENICLINE TARTRATE 0.5 MG/1
.5-1 TABLET, FILM COATED ORAL SEE ADMIN INSTRUCTIONS
Qty: 57 TABLET | Refills: 0 | Status: SHIPPED | OUTPATIENT
Start: 2025-02-27

## 2025-02-27 RX ORDER — DESVENLAFAXINE 50 MG/1
50 TABLET, FILM COATED, EXTENDED RELEASE ORAL DAILY
Qty: 30 TABLET | Refills: 5 | Status: SHIPPED | OUTPATIENT
Start: 2025-02-27

## 2025-02-27 RX ORDER — BUPROPION HYDROCHLORIDE 150 MG/1
150 TABLET ORAL EVERY MORNING
Qty: 30 TABLET | Refills: 5 | Status: SHIPPED | OUTPATIENT
Start: 2025-02-27

## 2025-02-27 RX ORDER — ALPRAZOLAM 1 MG/1
1 TABLET ORAL 3 TIMES DAILY PRN
Qty: 90 TABLET | Refills: 0 | Status: SHIPPED | OUTPATIENT
Start: 2025-02-27 | End: 2025-03-29

## 2025-02-27 SDOH — ECONOMIC STABILITY: FOOD INSECURITY: WITHIN THE PAST 12 MONTHS, THE FOOD YOU BOUGHT JUST DIDN'T LAST AND YOU DIDN'T HAVE MONEY TO GET MORE.: NEVER TRUE

## 2025-02-27 SDOH — ECONOMIC STABILITY: FOOD INSECURITY: WITHIN THE PAST 12 MONTHS, YOU WORRIED THAT YOUR FOOD WOULD RUN OUT BEFORE YOU GOT MONEY TO BUY MORE.: NEVER TRUE

## 2025-02-27 ASSESSMENT — PATIENT HEALTH QUESTIONNAIRE - PHQ9
SUM OF ALL RESPONSES TO PHQ QUESTIONS 1-9: 0
SUM OF ALL RESPONSES TO PHQ QUESTIONS 1-9: 0
1. LITTLE INTEREST OR PLEASURE IN DOING THINGS: NOT AT ALL
SUM OF ALL RESPONSES TO PHQ9 QUESTIONS 1 & 2: 0
3. TROUBLE FALLING OR STAYING ASLEEP: NOT AT ALL
9. THOUGHTS THAT YOU WOULD BE BETTER OFF DEAD, OR OF HURTING YOURSELF: NOT AT ALL
SUM OF ALL RESPONSES TO PHQ QUESTIONS 1-9: 0
4. FEELING TIRED OR HAVING LITTLE ENERGY: NOT AT ALL
10. IF YOU CHECKED OFF ANY PROBLEMS, HOW DIFFICULT HAVE THESE PROBLEMS MADE IT FOR YOU TO DO YOUR WORK, TAKE CARE OF THINGS AT HOME, OR GET ALONG WITH OTHER PEOPLE: NOT DIFFICULT AT ALL
7. TROUBLE CONCENTRATING ON THINGS, SUCH AS READING THE NEWSPAPER OR WATCHING TELEVISION: NOT AT ALL
8. MOVING OR SPEAKING SO SLOWLY THAT OTHER PEOPLE COULD HAVE NOTICED. OR THE OPPOSITE, BEING SO FIGETY OR RESTLESS THAT YOU HAVE BEEN MOVING AROUND A LOT MORE THAN USUAL: NOT AT ALL
5. POOR APPETITE OR OVEREATING: NOT AT ALL
2. FEELING DOWN, DEPRESSED OR HOPELESS: NOT AT ALL
SUM OF ALL RESPONSES TO PHQ QUESTIONS 1-9: 0
6. FEELING BAD ABOUT YOURSELF - OR THAT YOU ARE A FAILURE OR HAVE LET YOURSELF OR YOUR FAMILY DOWN: NOT AT ALL

## 2025-03-28 DIAGNOSIS — F41.9 ANXIETY: ICD-10-CM

## 2025-03-28 RX ORDER — ALPRAZOLAM 1 MG/1
1 TABLET ORAL 3 TIMES DAILY PRN
Qty: 90 TABLET | Refills: 0 | Status: SHIPPED | OUTPATIENT
Start: 2025-03-28 | End: 2025-04-27

## 2025-03-28 NOTE — TELEPHONE ENCOUNTER
Last office visit 2/27/2025      Next office visit scheduled 5/27/2025    Requested Prescriptions     Pending Prescriptions Disp Refills    ALPRAZolam (XANAX) 1 MG tablet [Pharmacy Med Name: ALPRAZOLAM 1 MG TABLET] 90 tablet 0     Sig: Take 1 tablet by mouth 3 times daily as needed for Sleep for up to 30 days. Max Daily Amount: 3 mg

## 2025-04-25 DIAGNOSIS — F41.9 ANXIETY: ICD-10-CM

## 2025-04-27 RX ORDER — ALPRAZOLAM 1 MG/1
1 TABLET ORAL 3 TIMES DAILY PRN
Qty: 90 TABLET | Refills: 0 | Status: SHIPPED | OUTPATIENT
Start: 2025-04-27 | End: 2025-05-27

## 2025-05-21 NOTE — PROGRESS NOTES
Subjective:      Patient ID: Jeffrey De La Cruz is a 44 y.o. male.    HPI    Depression: Patient is tolerating and compliant with Pristiq 50 mg daily, Wellbutrin  mg daily and Trazodone 150 mg nightly as needed.  He feels that the medication is helping with his symptoms.  He denies any suicidal ideation.  He complains of poor motivation, insomnia and irritability and emotionality over the past month.  He feels that it is from not liking his current job.       Anxiety:  Patient takes Xanax 1 mg TID prn.   He feels that it works well to control his anxiety.   He does not always take it TID.      Review of Systems   Constitutional:  Negative for chills and fever.   Psychiatric/Behavioral:  Negative for dysphoric mood and suicidal ideas. The patient is nervous/anxious.      /82   Ht 1.575 m (5' 2\")   Wt 75.2 kg (165 lb 12.8 oz)   BMI 30.33 kg/m²    Objective:   Physical Exam  Vitals reviewed.   Constitutional:       General: He is not in acute distress.     Appearance: He is well-developed.   HENT:      Head: Normocephalic.      Right Ear: External ear normal.      Left Ear: External ear normal.   Neck:      Thyroid: No thyromegaly.      Vascular: No carotid bruit or JVD.   Cardiovascular:      Rate and Rhythm: Normal rate and regular rhythm.      Heart sounds: Normal heart sounds. No murmur heard.  Pulmonary:      Effort: Pulmonary effort is normal.      Breath sounds: Normal breath sounds. No wheezing or rales.   Lymphadenopathy:      Cervical: No cervical adenopathy.   Neurological:      Mental Status: He is alert and oriented to person, place, and time.         Assessment:      Depression   Anxiety       Plan:   Assessment & Plan   OARRS report was reviewed  Increase the Pristiq to 100 mg daily.    Medications refilled   RTO 3 months for Depression / Anxiety / Insomnia / Sleep Apnea / Tobacco Abuse       Controlled Substance Monitoring:    Acute and Chronic Pain Monitoring:   RX Monitoring Periodic

## 2025-05-27 ENCOUNTER — OFFICE VISIT (OUTPATIENT)
Dept: FAMILY MEDICINE CLINIC | Age: 45
End: 2025-05-27
Payer: COMMERCIAL

## 2025-05-27 VITALS
DIASTOLIC BLOOD PRESSURE: 82 MMHG | WEIGHT: 165.8 LBS | BODY MASS INDEX: 30.51 KG/M2 | HEIGHT: 62 IN | SYSTOLIC BLOOD PRESSURE: 114 MMHG

## 2025-05-27 DIAGNOSIS — F51.01 PRIMARY INSOMNIA: ICD-10-CM

## 2025-05-27 DIAGNOSIS — F41.9 ANXIETY: ICD-10-CM

## 2025-05-27 DIAGNOSIS — F32.4 MAJOR DEPRESSION SINGLE EPISODE, IN PARTIAL REMISSION: Primary | ICD-10-CM

## 2025-05-27 PROCEDURE — 99213 OFFICE O/P EST LOW 20 MIN: CPT | Performed by: FAMILY MEDICINE

## 2025-05-27 RX ORDER — ALPRAZOLAM 1 MG/1
1 TABLET ORAL 3 TIMES DAILY PRN
Qty: 90 TABLET | Refills: 0 | Status: SHIPPED | OUTPATIENT
Start: 2025-05-27 | End: 2025-06-26

## 2025-05-27 RX ORDER — DESVENLAFAXINE 100 MG/1
100 TABLET, EXTENDED RELEASE ORAL DAILY
Qty: 30 TABLET | Refills: 5 | Status: SHIPPED | OUTPATIENT
Start: 2025-05-27

## 2025-05-27 RX ORDER — DESVENLAFAXINE 50 MG/1
50 TABLET, FILM COATED, EXTENDED RELEASE ORAL DAILY
Qty: 30 TABLET | Refills: 5 | Status: CANCELLED | OUTPATIENT
Start: 2025-05-27

## 2025-05-27 RX ORDER — BUPROPION HYDROCHLORIDE 150 MG/1
150 TABLET ORAL EVERY MORNING
Qty: 30 TABLET | Refills: 5 | Status: SHIPPED | OUTPATIENT
Start: 2025-05-27

## 2025-05-27 RX ORDER — TRAZODONE HYDROCHLORIDE 150 MG/1
150 TABLET ORAL NIGHTLY
Qty: 30 TABLET | Refills: 5 | Status: SHIPPED | OUTPATIENT
Start: 2025-05-27

## 2025-06-26 DIAGNOSIS — F41.9 ANXIETY: ICD-10-CM

## 2025-06-26 RX ORDER — ALPRAZOLAM 1 MG/1
1 TABLET ORAL 3 TIMES DAILY PRN
Qty: 90 TABLET | Refills: 0 | Status: SHIPPED | OUTPATIENT
Start: 2025-06-26 | End: 2025-07-26

## 2025-06-26 NOTE — TELEPHONE ENCOUNTER
Last office visit 5/27/2025       Next office visit scheduled Visit date not found    Requested Prescriptions     Pending Prescriptions Disp Refills    ALPRAZolam (XANAX) 1 MG tablet [Pharmacy Med Name: ALPRAZOLAM 1 MG TABLET] 90 tablet 0     Sig: Take 1 tablet by mouth 3 times daily as needed for Sleep for up to 30 days. Max Daily Amount: 3 mg

## 2025-07-27 DIAGNOSIS — F41.9 ANXIETY: ICD-10-CM

## 2025-07-27 RX ORDER — ALPRAZOLAM 1 MG/1
1 TABLET ORAL 3 TIMES DAILY PRN
Qty: 90 TABLET | Refills: 0 | Status: SHIPPED | OUTPATIENT
Start: 2025-07-27 | End: 2025-08-26

## 2025-08-27 DIAGNOSIS — F41.9 ANXIETY: ICD-10-CM

## 2025-08-27 RX ORDER — ALPRAZOLAM 1 MG/1
1 TABLET ORAL 3 TIMES DAILY PRN
Qty: 90 TABLET | Refills: 0 | Status: SHIPPED | OUTPATIENT
Start: 2025-08-27 | End: 2025-09-26

## (undated) DEVICE — SUTURE PDS II SZ 5-0 L18IN ABSRB UD P-3 L13MM 3/8 CIR PRIM Z493G

## (undated) DEVICE — TOWEL,OR,DSP,ST,BLUE,STD,4/PK,20PK/CS: Brand: MEDLINE

## (undated) DEVICE — TUBING, SUCTION, 1/4" X 12', STRAIGHT: Brand: MEDLINE

## (undated) DEVICE — SUTURE CHROMIC GUT SZ 5-0 L18IN ABSRB BRN P-3 L13MM 3/8 CIR 687G

## (undated) DEVICE — GOWN SIRUS NONREIN XL W/TWL: Brand: MEDLINE INDUSTRIES, INC.

## (undated) DEVICE — SUTURE PROL SZ 6-0 L18IN NONABSORBABLE BLU L36MM P-1 3/8 8697G

## (undated) DEVICE — Device

## (undated) DEVICE — PENCIL ES L3M BTTN SWCH S STL HEX LOK BLDE ELECTRD HOLSTER

## (undated) DEVICE — BLADE ES ELASTOMERIC COAT INSUL DURABLE BEND UPTO 90DEG

## (undated) DEVICE — SYRINGE MED 10ML LUERLOCK TIP W/O SFTY DISP

## (undated) DEVICE — Z DISCONTINUED BY MEDLINE USE 2711682 TRAY SKIN PREP DRY W/ PREM GLV

## (undated) DEVICE — ELECTRODE PT RET AD L9FT HI MOIST COND ADH HYDRGEL CORDED

## (undated) DEVICE — MERCY HEALTH WEST TURNOVER: Brand: MEDLINE INDUSTRIES, INC.

## (undated) DEVICE — MASTISOL ADHESIVE LIQ 2/3ML

## (undated) DEVICE — SUTURE ABSORBABLE MONOFILAMENT 4-0 SC-1 18 IN PLN GUT 1824H

## (undated) DEVICE — KIT,ANTI FOG,W/SPONGE & FLUID,SOFT PACK: Brand: MEDLINE

## (undated) DEVICE — SYRINGE MED 30ML STD CLR PLAS LUERLOCK TIP N CTRL DISP

## (undated) DEVICE — SOLUTION PREP POVIDONE IOD FOR SKIN MUCOUS MEM PRIOR TO

## (undated) DEVICE — SUTURE 4-0 L18IN ABSRB BRN PS-4 L16MM 1/2 CIR PRIM REV CUT 1643G

## (undated) DEVICE — TURBINATOR WAND: Brand: COBLATION

## (undated) DEVICE — SUTURE PROL SZ 3-0 L18IN NONABSORBABLE BLU L30MM FS-1 3/8 8663G

## (undated) DEVICE — INTENDED FOR TISSUE SEPARATION, AND OTHER PROCEDURES THAT REQUIRE A SHARP SURGICAL BLADE TO PUNCTURE OR CUT.: Brand: BARD-PARKER ® STAINLESS STEEL BLADES

## (undated) DEVICE — SKIN MARKER REGULAR TIP WITH RULER CAP AND LABELS: Brand: DEVON

## (undated) DEVICE — SOLUTION SCRB 4OZ 10% POVIDONE IOD ANTIMIC BTL

## (undated) DEVICE — SPLINT NSL TRAPEZOIDAL IVRY

## (undated) DEVICE — COVER LT HNDL BLU PLAS

## (undated) DEVICE — SUTURE ABSORBABLE BRAIDED 4-0 P-3 18 IN UD VICRYL J494G

## (undated) DEVICE — SYRINGE,EAR/ULCER, 2 OZ, STERILE: Brand: MEDLINE

## (undated) DEVICE — GLOVE ORANGE PI 7 1/2   MSG9075

## (undated) DEVICE — NEEDLE HYPO 27GA L15IN REG BVL W O SFTY FOR SYR DISPOSABLE

## (undated) DEVICE — STRIP,CLOSURE,WOUND,MEDI-STRIP,1/2X4: Brand: MEDLINE

## (undated) DEVICE — AGENT HEMSTAT W2XL3IN OXIDIZED REGENERATED CELOS ABSRB

## (undated) DEVICE — ENT I-LF: Brand: MEDLINE INDUSTRIES, INC.

## (undated) DEVICE — SPONGE,NEURO,1"X3",XR,STRL,LF,10/PK: Brand: MEDLINE